# Patient Record
Sex: MALE | Race: WHITE | Employment: OTHER | URBAN - METROPOLITAN AREA
[De-identification: names, ages, dates, MRNs, and addresses within clinical notes are randomized per-mention and may not be internally consistent; named-entity substitution may affect disease eponyms.]

---

## 2017-01-09 NOTE — TELEPHONE ENCOUNTER
Spouse is requesting to have a call back from the nurse   Pharm stts they sent a request five days ago   Pt is requesting to have more than 1 refill     Current Outpatient Prescriptions:  Dulaglutide (TRULICITY) 1.5 PE/8.1CL Subcutaneous Solution Pen-injec

## 2017-01-11 NOTE — TELEPHONE ENCOUNTER
please advise as does not meet RN protocol for refill criteria- historical written med  Also requesting refills added in    Diabetes Medications  Protocol Criteria:  · Appointment scheduled in the past 6 months or the next 3 months  · A1C < 7.5 in the pas

## 2017-01-12 NOTE — TELEPHONE ENCOUNTER
This medication has been prescribed and refilled by Dr. Joseph Camara. Please send to endocrinology department.

## 2017-01-16 NOTE — TELEPHONE ENCOUNTER
Pt's wife states that she Needs 90 day supply of trulicity medication, as it will be more cost effective.        Current Outpatient Prescriptions:  Dulaglutide (TRULICITY) 1.5 RQ/7.1HL Subcutaneous Solution Pen-injector Inject 1.5 mg into the skin every 7 d

## 2017-03-15 RX ORDER — METFORMIN HYDROCHLORIDE 500 MG/1
TABLET, EXTENDED RELEASE ORAL
Qty: 180 TABLET | Refills: 1 | Status: SHIPPED | OUTPATIENT
Start: 2017-03-15 | End: 2017-09-07 | Stop reason: DRUGHIGH

## 2017-04-24 NOTE — TELEPHONE ENCOUNTER
Current Outpatient Prescriptions:        Dulaglutide (TRULICITY) 1.5 ZG/4.7UD Subcutaneous Solution Pen-injector Inject 1.5 mg into the skin every 7 days.  Inject 1.5 mg into the skin every 7 days Disp: 12 pen Rfl: 0

## 2017-05-19 ENCOUNTER — APPOINTMENT (OUTPATIENT)
Dept: LAB | Facility: HOSPITAL | Age: 72
End: 2017-05-19
Attending: INTERNAL MEDICINE
Payer: MEDICARE

## 2017-05-19 DIAGNOSIS — IMO0001 TYPE II OR UNSPECIFIED TYPE DIABETES MELLITUS WITHOUT MENTION OF COMPLICATION, UNCONTROLLED: ICD-10-CM

## 2017-05-19 PROCEDURE — 82570 ASSAY OF URINE CREATININE: CPT

## 2017-05-19 PROCEDURE — 82043 UR ALBUMIN QUANTITATIVE: CPT

## 2017-05-19 PROCEDURE — 80061 LIPID PANEL: CPT

## 2017-05-19 PROCEDURE — 36415 COLL VENOUS BLD VENIPUNCTURE: CPT

## 2017-05-19 PROCEDURE — 80053 COMPREHEN METABOLIC PANEL: CPT

## 2017-05-19 PROCEDURE — 83036 HEMOGLOBIN GLYCOSYLATED A1C: CPT

## 2017-05-19 PROCEDURE — 84443 ASSAY THYROID STIM HORMONE: CPT

## 2017-06-07 ENCOUNTER — OFFICE VISIT (OUTPATIENT)
Dept: ENDOCRINOLOGY CLINIC | Facility: CLINIC | Age: 72
End: 2017-06-07

## 2017-06-07 VITALS
DIASTOLIC BLOOD PRESSURE: 89 MMHG | HEART RATE: 80 BPM | HEIGHT: 71 IN | SYSTOLIC BLOOD PRESSURE: 134 MMHG | WEIGHT: 307 LBS | BODY MASS INDEX: 42.98 KG/M2

## 2017-06-07 DIAGNOSIS — E11.65 UNCONTROLLED TYPE 2 DIABETES MELLITUS WITH HYPERGLYCEMIA, WITH LONG-TERM CURRENT USE OF INSULIN (HCC): Primary | ICD-10-CM

## 2017-06-07 DIAGNOSIS — Z79.4 UNCONTROLLED TYPE 2 DIABETES MELLITUS WITH HYPERGLYCEMIA, WITH LONG-TERM CURRENT USE OF INSULIN (HCC): Primary | ICD-10-CM

## 2017-06-07 DIAGNOSIS — IMO0001 UNCONTROLLED DIABETES MELLITUS TYPE 2 WITHOUT COMPLICATIONS, UNSPECIFIED LONG TERM INSULIN USE STATUS: ICD-10-CM

## 2017-06-07 PROCEDURE — 82962 GLUCOSE BLOOD TEST: CPT | Performed by: INTERNAL MEDICINE

## 2017-06-07 PROCEDURE — 99213 OFFICE O/P EST LOW 20 MIN: CPT | Performed by: INTERNAL MEDICINE

## 2017-06-07 PROCEDURE — 36416 COLLJ CAPILLARY BLOOD SPEC: CPT | Performed by: INTERNAL MEDICINE

## 2017-06-07 RX ORDER — METFORMIN HYDROCHLORIDE 500 MG/1
TABLET, EXTENDED RELEASE ORAL
Qty: 360 TABLET | Refills: 3 | Status: SHIPPED | OUTPATIENT
Start: 2017-06-07 | End: 2017-09-07

## 2017-06-07 NOTE — PROGRESS NOTES
Name: Manuel Gee  Date: 6/7/2017    Referring Physician: No ref. provider found    HISTORY OF PRESENT ILLNESS   Manuel Gee is a 80year old male who presents for diabetes mellitus, diagnosed at age 49.       Prior HbA, C or glycohemoglobin wer by mouth daily. , Disp: 90 tablet, Rfl: 1  •  MetFORMIN HCl  MG Oral Tablet 24 Hr, TAKE 1 TABLET TWICE A DAY  WITH MEALS, Disp: 180 tablet, Rfl: 3  •  Pravastatin Sodium (PRAVACHOL) 40 MG Oral Tab, Take 1 tablet (40 mg total) by mouth nightly., Disp: LEG/ANKLE SURGERY PROC UNLISTED Left in  1980's    Comment bone chips removed from ankle    HERNIA SURGERY Right [facility]  /89 mmHg  Pulse 80  Ht 5' 11\" (1.803 m)  Wt 307 lb (139.254 kg)  BMI 42.84 kg/m2    Gener

## 2017-07-06 NOTE — TELEPHONE ENCOUNTER
From: Doreen Frankel  Sent: 7/6/2017 5:17 PM CDT  Subject: Medication Renewal Request    Doreen Frankel would like a refill of the following medications:  Dulaglutide (TRULICITY) 1.5 FX/6.3LD Subcutaneous Solution Pen-injector [Sophia Hernandez MD]    P

## 2017-09-03 DIAGNOSIS — I10 ESSENTIAL HYPERTENSION: ICD-10-CM

## 2017-09-05 RX ORDER — LOSARTAN POTASSIUM 50 MG/1
TABLET ORAL
Qty: 30 TABLET | Refills: 1 | Status: SHIPPED | OUTPATIENT
Start: 2017-09-05 | End: 2017-09-07

## 2017-09-07 ENCOUNTER — OFFICE VISIT (OUTPATIENT)
Dept: ENDOCRINOLOGY CLINIC | Facility: CLINIC | Age: 72
End: 2017-09-07

## 2017-09-07 ENCOUNTER — OFFICE VISIT (OUTPATIENT)
Dept: NEPHROLOGY | Facility: CLINIC | Age: 72
End: 2017-09-07

## 2017-09-07 VITALS
WEIGHT: 307.63 LBS | HEIGHT: 71 IN | SYSTOLIC BLOOD PRESSURE: 149 MMHG | DIASTOLIC BLOOD PRESSURE: 92 MMHG | BODY MASS INDEX: 43.07 KG/M2 | HEART RATE: 73 BPM

## 2017-09-07 VITALS
HEART RATE: 73 BPM | WEIGHT: 307.38 LBS | BODY MASS INDEX: 43.03 KG/M2 | HEIGHT: 71 IN | DIASTOLIC BLOOD PRESSURE: 96 MMHG | TEMPERATURE: 99 F | SYSTOLIC BLOOD PRESSURE: 158 MMHG

## 2017-09-07 DIAGNOSIS — IMO0001 UNCONTROLLED DIABETES MELLITUS TYPE 2 WITHOUT COMPLICATIONS, UNSPECIFIED LONG TERM INSULIN USE STATUS: ICD-10-CM

## 2017-09-07 DIAGNOSIS — E78.5 HYPERLIPIDEMIA LDL GOAL <100: ICD-10-CM

## 2017-09-07 DIAGNOSIS — I10 ESSENTIAL HYPERTENSION: ICD-10-CM

## 2017-09-07 DIAGNOSIS — E11.65 UNCONTROLLED TYPE 2 DIABETES MELLITUS WITH HYPERGLYCEMIA, WITH LONG-TERM CURRENT USE OF INSULIN (HCC): Primary | ICD-10-CM

## 2017-09-07 DIAGNOSIS — Z79.4 UNCONTROLLED TYPE 2 DIABETES MELLITUS WITH HYPERGLYCEMIA, WITH LONG-TERM CURRENT USE OF INSULIN (HCC): Primary | ICD-10-CM

## 2017-09-07 LAB
CARTRIDGE LOT#: ABNORMAL NUMERIC
GLUCOSE BLOOD: 132
HEMOGLOBIN A1C: 7 % (ref 4.3–5.6)
TEST STRIP LOT #: NORMAL NUMERIC

## 2017-09-07 PROCEDURE — 82962 GLUCOSE BLOOD TEST: CPT | Performed by: INTERNAL MEDICINE

## 2017-09-07 PROCEDURE — 36416 COLLJ CAPILLARY BLOOD SPEC: CPT | Performed by: INTERNAL MEDICINE

## 2017-09-07 PROCEDURE — 83036 HEMOGLOBIN GLYCOSYLATED A1C: CPT | Performed by: INTERNAL MEDICINE

## 2017-09-07 PROCEDURE — 99213 OFFICE O/P EST LOW 20 MIN: CPT | Performed by: INTERNAL MEDICINE

## 2017-09-07 PROCEDURE — G0463 HOSPITAL OUTPT CLINIC VISIT: HCPCS | Performed by: INTERNAL MEDICINE

## 2017-09-07 RX ORDER — FENOFIBRATE 160 MG/1
160 TABLET ORAL DAILY
Qty: 90 TABLET | Refills: 3 | Status: SHIPPED | OUTPATIENT
Start: 2017-09-07 | End: 2018-11-28

## 2017-09-07 RX ORDER — GLIPIZIDE 10 MG/1
10 TABLET, FILM COATED, EXTENDED RELEASE ORAL 2 TIMES DAILY
Qty: 180 TABLET | Refills: 3 | Status: SHIPPED | OUTPATIENT
Start: 2017-09-07 | End: 2018-11-28

## 2017-09-07 RX ORDER — LOSARTAN POTASSIUM 50 MG/1
50 TABLET ORAL
Qty: 90 TABLET | Refills: 3 | Status: SHIPPED | OUTPATIENT
Start: 2017-09-07 | End: 2017-12-04 | Stop reason: DRUGHIGH

## 2017-09-07 RX ORDER — AMLODIPINE BESYLATE 10 MG/1
10 TABLET ORAL DAILY
Qty: 90 TABLET | Refills: 3 | Status: SHIPPED | OUTPATIENT
Start: 2017-09-07 | End: 2018-11-28

## 2017-09-07 RX ORDER — PRAVASTATIN SODIUM 40 MG
40 TABLET ORAL NIGHTLY
Qty: 90 TABLET | Refills: 3 | Status: SHIPPED | OUTPATIENT
Start: 2017-09-07 | End: 2018-11-28

## 2017-09-07 RX ORDER — METFORMIN HYDROCHLORIDE 500 MG/1
TABLET, EXTENDED RELEASE ORAL
Qty: 360 TABLET | Refills: 3 | Status: SHIPPED | OUTPATIENT
Start: 2017-09-07 | End: 2018-11-28

## 2017-09-07 NOTE — PROGRESS NOTES
Progress Note     Palmira Trivedi is a 72 yrs old male with pmh of DM x 20 yrs, HTN X 20 yrs, sleep apnea, morbidly obese on CPAP, right hip replacement, CVA who presented today for follow up     Patient baseline creatinine is around 1.0-1.1 till 2013, ELECTROCARDIOGRAM, COMPLETE      Comment: scanned to media tab  1979: HERNIA SURGERY Right  in  1980's: LEG/ANKLE SURGERY PROC UNLISTED Left      Comment: bone chips removed from ankle  2013: TOTAL HIP REPLACEMENT Right  1982: VASECTOMY        Medications wheezing  Gastrointestinal:  Negative for abdominal pain, constipation  Genitourinary:  Negative for dysuria and hematuria  Hema/Lymph:  Negative for easy bleeding and easy bruising  Integumentary:  Negative for pruritus and rash  Musculoskeletal:  Negativ Visit:    Signed Prescriptions Disp Refills    Losartan Potassium 50 MG Oral Tab 90 tablet 3      Sig: Take 1 tablet (50 mg total) by mouth once daily.       AmLODIPine Besylate 10 MG Oral Tab 90 tablet 3      Sig: Take 1 tablet (10 mg total) by mouth daily

## 2017-09-07 NOTE — PROGRESS NOTES
Name: Rosana Zimmerman  Date: 9/7/2017    Referring Physician: No ref. provider found    HISTORY OF PRESENT ILLNESS   Rosana Zimmerman is a 80year old male who presents for diabetes mellitus, diagnosed at age 49.       Prior HbA, C or glycohemoglobin wer tablet (100 mg total) by mouth once daily. , Disp: 90 tablet, Rfl: 3  •  Fenofibrate 160 MG Oral Tab, Take 1 tablet (160 mg total) by mouth daily. , Disp: 90 tablet, Rfl: 3  •  Pravastatin Sodium (PRAVACHOL) 40 MG Oral Tab, Take 1 tablet (40 mg total) by igor TOTAL HIP REPLACEMENT Right  1982: VASECTOMY      PHYSICAL EXAM  /92 (BP Location: Left arm, Patient Position: Sitting, Cuff Size: large)   Pulse 73   Ht 5' 11\" (1.803 m)   Wt (!) 307 lb 9.6 oz (139.5 kg)   BMI 42.90 kg/m²     General Appearance:  a

## 2017-11-14 ENCOUNTER — OFFICE VISIT (OUTPATIENT)
Dept: INTERNAL MEDICINE CLINIC | Facility: CLINIC | Age: 72
End: 2017-11-14

## 2017-11-14 VITALS
BODY MASS INDEX: 43.45 KG/M2 | HEART RATE: 77 BPM | SYSTOLIC BLOOD PRESSURE: 152 MMHG | DIASTOLIC BLOOD PRESSURE: 86 MMHG | HEIGHT: 71 IN | TEMPERATURE: 98 F | WEIGHT: 310.38 LBS

## 2017-11-14 DIAGNOSIS — E79.0 HYPERURICEMIA: ICD-10-CM

## 2017-11-14 DIAGNOSIS — R07.89 CHEST WALL PAIN: Primary | ICD-10-CM

## 2017-11-14 DIAGNOSIS — I10 ESSENTIAL HYPERTENSION: ICD-10-CM

## 2017-11-14 PROCEDURE — 99213 OFFICE O/P EST LOW 20 MIN: CPT | Performed by: INTERNAL MEDICINE

## 2017-11-14 PROCEDURE — G0463 HOSPITAL OUTPT CLINIC VISIT: HCPCS | Performed by: INTERNAL MEDICINE

## 2017-11-14 RX ORDER — ALLOPURINOL 100 MG/1
100 TABLET ORAL
Qty: 90 TABLET | Refills: 3 | Status: SHIPPED | OUTPATIENT
Start: 2017-11-14 | End: 2018-11-15

## 2017-11-14 NOTE — PATIENT INSTRUCTIONS
Please apply heat to the area of pain below your left shoulder 2-3 times daily. Take Tylenol or acetaminophen as needed for pain relief. Await upcoming physical in December.

## 2017-11-14 NOTE — PROGRESS NOTES
Alex Krishnan is a 80year old male who presents this afternoon for evaluation. HPI:   For the past week, he has had persistent intermittent pain in his upper left anterior chest wall just below his left shoulder.   Pain is \"annoying,\" but does not l total) by mouth once daily. Disp: 90 tablet Rfl: 3   AmLODIPine Besylate 10 MG Oral Tab Take 1 tablet (10 mg total) by mouth daily.  Disp: 90 tablet Rfl: 3   Dulaglutide (TRULICITY) 1.5 CV/7.3IZ Subcutaneous Solution Pen-injector Inject 1.5 mg into the skin Heart Disease Mother      Valvular heart disease   • Diabetes Father    • Hypertension Father    • CVA [OTHER] Father      Age 62s      Social History:  Smoking status: Former Smoker                                                              Packs/day: 1 for evaluation

## 2017-12-04 ENCOUNTER — OFFICE VISIT (OUTPATIENT)
Dept: INTERNAL MEDICINE CLINIC | Facility: CLINIC | Age: 72
End: 2017-12-04

## 2017-12-04 VITALS
WEIGHT: 311 LBS | DIASTOLIC BLOOD PRESSURE: 82 MMHG | BODY MASS INDEX: 43.54 KG/M2 | HEART RATE: 83 BPM | HEIGHT: 71 IN | SYSTOLIC BLOOD PRESSURE: 150 MMHG

## 2017-12-04 DIAGNOSIS — E11.9 TYPE 2 DIABETES MELLITUS WITHOUT COMPLICATION, UNSPECIFIED LONG TERM INSULIN USE STATUS: ICD-10-CM

## 2017-12-04 DIAGNOSIS — E79.0 HYPERURICEMIA: ICD-10-CM

## 2017-12-04 DIAGNOSIS — E78.5 DYSLIPIDEMIA: ICD-10-CM

## 2017-12-04 DIAGNOSIS — E66.01 MORBID OBESITY (HCC): ICD-10-CM

## 2017-12-04 DIAGNOSIS — Q21.1 ASD (ATRIAL SEPTAL DEFECT): ICD-10-CM

## 2017-12-04 DIAGNOSIS — G47.33 OBSTRUCTIVE SLEEP APNEA: ICD-10-CM

## 2017-12-04 DIAGNOSIS — Z00.00 ANNUAL PHYSICAL EXAM: Primary | ICD-10-CM

## 2017-12-04 DIAGNOSIS — I10 ESSENTIAL HYPERTENSION: ICD-10-CM

## 2017-12-04 DIAGNOSIS — Z00.00 ENCOUNTER FOR ANNUAL HEALTH EXAMINATION: ICD-10-CM

## 2017-12-04 DIAGNOSIS — M19.91 PRIMARY OSTEOARTHRITIS, UNSPECIFIED SITE: ICD-10-CM

## 2017-12-04 DIAGNOSIS — Z12.5 PROSTATE CANCER SCREENING: ICD-10-CM

## 2017-12-04 DIAGNOSIS — Z86.73 HISTORY OF CVA (CEREBROVASCULAR ACCIDENT): ICD-10-CM

## 2017-12-04 DIAGNOSIS — N18.2 CHRONIC KIDNEY DISEASE, STAGE II (MILD): ICD-10-CM

## 2017-12-04 DIAGNOSIS — I70.0 AORTIC ATHEROSCLEROSIS (HCC): ICD-10-CM

## 2017-12-04 PROCEDURE — G0439 PPPS, SUBSEQ VISIT: HCPCS | Performed by: INTERNAL MEDICINE

## 2017-12-04 RX ORDER — LOSARTAN POTASSIUM 100 MG/1
100 TABLET ORAL DAILY
Qty: 90 TABLET | Refills: 3 | Status: SHIPPED | OUTPATIENT
Start: 2017-12-04 | End: 2018-11-15

## 2017-12-04 NOTE — H&P
Doreen Frankel is a 80year old male who presents for a complete physical exam.  HPI:   He feels well today. No specific issues for discussion. Left sided chest wall pain which prompted a recent visit has resolved.   He follows regularly with [facility] (TRULICITY) 1.5 JL/9.2JR Subcutaneous Solution Pen-injector Inject 1.5 mg into the skin every 7 days.  Inject 1.5 mg into the skin every 7 days Disp: 12 pen Rfl: 3   MetFORMIN HCl  MG Oral Tablet 24 Hr TAKE 2 TABLET TWICE A DAY  WITH MEALS Disp: 360 t Former Smoker                                                              Packs/day: 1.00      Years: 20.00        Types: Cigarettes     Quit date: 1/1/1978  Smokeless tobacco: Never Used                      Alcohol use: Yes           0.0 oz/week     Com pneumococcal vaccines and Zostavax. He declines currently. Check CMP CBC lipid profile uric acid level and screening PSA when able. Order sent. Increase losartan to 100 mg daily. Prescription sent to pharmacy. Continue other medications.   Return visi

## 2017-12-04 NOTE — PATIENT INSTRUCTIONS
Please obtain blood tests soon when you can. Increase losartan to 100 mg daily. Continue other medications. Please try to eat healthy, exercise regularly and lose weight. Return visit in 4-6 weeks for blood pressure check.     [address] aneurysm screening (once between ages 73-68)  No results found for this or any previous visit.  Limited to patients who meet one of the following criteria:   • Men who are 73-68 years old and have smoked more than 100 cigarettes in their lifetime   • Anyone VIII or IX concentrates   Clients of institutions for the mentally retarded   Persons who live in the same house as a HepB virus carrier   Homosexual men   Illicit injectable drug abusers     Tetanus Toxoid- Only covered with a cut with metal- TD and TDaP

## 2017-12-06 ENCOUNTER — PATIENT MESSAGE (OUTPATIENT)
Dept: INTERNAL MEDICINE CLINIC | Facility: CLINIC | Age: 72
End: 2017-12-06

## 2017-12-06 DIAGNOSIS — H91.90 DECREASED HEARING, UNSPECIFIED LATERALITY: Primary | ICD-10-CM

## 2017-12-07 ENCOUNTER — APPOINTMENT (OUTPATIENT)
Dept: LAB | Facility: HOSPITAL | Age: 72
End: 2017-12-07
Attending: INTERNAL MEDICINE
Payer: MEDICARE

## 2017-12-07 DIAGNOSIS — N18.2 CHRONIC KIDNEY DISEASE, STAGE II (MILD): ICD-10-CM

## 2017-12-07 DIAGNOSIS — Z12.5 PROSTATE CANCER SCREENING: ICD-10-CM

## 2017-12-07 DIAGNOSIS — E78.5 DYSLIPIDEMIA: ICD-10-CM

## 2017-12-07 DIAGNOSIS — E79.0 HYPERURICEMIA: ICD-10-CM

## 2017-12-07 PROCEDURE — 80061 LIPID PANEL: CPT

## 2017-12-07 PROCEDURE — 36415 COLL VENOUS BLD VENIPUNCTURE: CPT

## 2017-12-07 PROCEDURE — 85027 COMPLETE CBC AUTOMATED: CPT

## 2017-12-07 PROCEDURE — 84550 ASSAY OF BLOOD/URIC ACID: CPT

## 2017-12-07 PROCEDURE — 80053 COMPREHEN METABOLIC PANEL: CPT

## 2017-12-07 NOTE — TELEPHONE ENCOUNTER
From: Nayeli Gleason  To: Denver Silber, MD  Sent: 12/6/2017 1:23 PM CST  Subject: Referral Request    I am going for hearing aids and Medicare requires our primary physician to send a prescription for a hearing test. I can pick it up at your office or

## 2017-12-21 ENCOUNTER — OFFICE VISIT (OUTPATIENT)
Dept: PULMONOLOGY | Facility: CLINIC | Age: 72
End: 2017-12-21

## 2017-12-21 VITALS
OXYGEN SATURATION: 97 % | SYSTOLIC BLOOD PRESSURE: 152 MMHG | BODY MASS INDEX: 43.54 KG/M2 | DIASTOLIC BLOOD PRESSURE: 81 MMHG | WEIGHT: 311 LBS | HEIGHT: 71 IN | HEART RATE: 90 BPM

## 2017-12-21 DIAGNOSIS — G47.33 OBSTRUCTIVE SLEEP APNEA: Primary | ICD-10-CM

## 2017-12-21 PROCEDURE — G0463 HOSPITAL OUTPT CLINIC VISIT: HCPCS | Performed by: INTERNAL MEDICINE

## 2017-12-21 PROCEDURE — 99213 OFFICE O/P EST LOW 20 MIN: CPT | Performed by: INTERNAL MEDICINE

## 2017-12-21 NOTE — PROGRESS NOTES
The patient is a 29-year-old male who I know well from prior evaluation of comes in now for follow-up. He is vigilant with use of his CPAP device but he is now drowsy again including falling asleep during the day.     Review of systems: Vision normal. Ear

## 2017-12-28 ENCOUNTER — OFFICE VISIT (OUTPATIENT)
Dept: SLEEP CENTER | Age: 72
End: 2017-12-28
Attending: INTERNAL MEDICINE
Payer: MEDICARE

## 2017-12-28 DIAGNOSIS — Z76.89 SLEEP CONCERN: Primary | ICD-10-CM

## 2017-12-28 PROCEDURE — 95811 POLYSOM 6/>YRS CPAP 4/> PARM: CPT

## 2017-12-30 NOTE — PROCEDURES
[facility]  Accredited by the [facility] Sleep Medicine ([facility])    PATIENT'S NAME: Kishor Moran PHYSICIAN: Kuldip Yoo MD   REFERRING PHYSICIAN: Kuldip Yoo MD   PATIENT ACCOUNT #: [de-identified] LOCATION: were 450 periodic limb movements for a periodic limb movement index of 118 events per hour of which 20 per hour were associated with arousal.  Lowest desaturation was to 84%, the average heart rate was 64 beats per minute, and there was no significant darius

## 2018-01-02 ENCOUNTER — TELEPHONE (OUTPATIENT)
Dept: PULMONOLOGY | Facility: CLINIC | Age: 73
End: 2018-01-02

## 2018-01-02 DIAGNOSIS — G47.33 OBSTRUCTIVE SLEEP APNEA: Primary | ICD-10-CM

## 2018-01-02 NOTE — TELEPHONE ENCOUNTER
----- Message from Rudolph San MD sent at 1/2/2018  2:20 PM CST -----  RN, I spoke with the patient regarding the results of his CPAP titration. Please work on setting him up with the device and appropriate follow-up.

## 2018-01-08 ENCOUNTER — TELEPHONE (OUTPATIENT)
Dept: PULMONOLOGY | Facility: CLINIC | Age: 73
End: 2018-01-08

## 2018-01-08 DIAGNOSIS — G47.33 OSA (OBSTRUCTIVE SLEEP APNEA): Primary | ICD-10-CM

## 2018-01-08 NOTE — TELEPHONE ENCOUNTER
Pt states that he has not heard from [facility] re: CPAP orders and is anxious to get started with new machine. Please call.

## 2018-01-08 NOTE — TELEPHONE ENCOUNTER
Spoke to Lenny Parham from [address] [place] [address] who stts that they received the order for cpap machine and supplies. Lenny Parham stts that Darien Cesar is taking care of pt order. Darien Cesar not available to talk, will try again later.

## 2018-01-16 ENCOUNTER — OFFICE VISIT (OUTPATIENT)
Dept: INTERNAL MEDICINE CLINIC | Facility: CLINIC | Age: 73
End: 2018-01-16

## 2018-01-16 VITALS
DIASTOLIC BLOOD PRESSURE: 78 MMHG | SYSTOLIC BLOOD PRESSURE: 138 MMHG | HEIGHT: 71 IN | WEIGHT: 313 LBS | HEART RATE: 85 BPM | BODY MASS INDEX: 43.82 KG/M2

## 2018-01-16 DIAGNOSIS — I10 ESSENTIAL HYPERTENSION: Primary | ICD-10-CM

## 2018-01-16 PROCEDURE — 99213 OFFICE O/P EST LOW 20 MIN: CPT | Performed by: INTERNAL MEDICINE

## 2018-01-16 PROCEDURE — G0463 HOSPITAL OUTPT CLINIC VISIT: HCPCS | Performed by: INTERNAL MEDICINE

## 2018-01-16 RX ORDER — MULTIVITAMIN
1 TABLET ORAL DAILY
COMMUNITY

## 2018-01-16 NOTE — PROGRESS NOTES
Josy Wright is a 71year old male. Patient presents with:  Hypertension    HPI:   Mr. Antonia Hill presents this morning for follow-up of hypertension. At his physical last month, dose of losartan was increased because of elevated blood pressure.   Labs morning.    Disp:  Rfl:    Glucose Blood (ONETOUCH ULTRA BLUE) In Vitro Strip test daily Disp:  Rfl:        Pcn [Penicillins]       Unknown   Past Medical History:   Diagnosis Date   • Aortic atherosclerosis (HCC)     CT scan 4-14   • ASD (atrial septal def patient is asked to return in 3 months.     Yandy Sunshine MD  1/16/2018  10:05 AM

## 2018-02-01 ENCOUNTER — OFFICE VISIT (OUTPATIENT)
Dept: ENDOCRINOLOGY CLINIC | Facility: CLINIC | Age: 73
End: 2018-02-01

## 2018-02-01 ENCOUNTER — TELEPHONE (OUTPATIENT)
Dept: NEPHROLOGY | Facility: CLINIC | Age: 73
End: 2018-02-01

## 2018-02-01 VITALS
BODY MASS INDEX: 43.4 KG/M2 | SYSTOLIC BLOOD PRESSURE: 150 MMHG | WEIGHT: 310 LBS | HEIGHT: 71 IN | DIASTOLIC BLOOD PRESSURE: 90 MMHG | HEART RATE: 83 BPM

## 2018-02-01 DIAGNOSIS — Z79.4 CONTROLLED TYPE 2 DIABETES MELLITUS WITH HYPERGLYCEMIA, WITH LONG-TERM CURRENT USE OF INSULIN (HCC): Primary | ICD-10-CM

## 2018-02-01 DIAGNOSIS — I10 UNCONTROLLED HYPERTENSION: Primary | ICD-10-CM

## 2018-02-01 DIAGNOSIS — E11.65 CONTROLLED TYPE 2 DIABETES MELLITUS WITH HYPERGLYCEMIA, WITH LONG-TERM CURRENT USE OF INSULIN (HCC): Primary | ICD-10-CM

## 2018-02-01 LAB
CARTRIDGE LOT#: ABNORMAL NUMERIC
GLUCOSE BLOOD: 185
HEMOGLOBIN A1C: 7.3 % (ref 4.3–5.6)
TEST STRIP LOT #: NORMAL NUMERIC

## 2018-02-01 PROCEDURE — 99213 OFFICE O/P EST LOW 20 MIN: CPT | Performed by: INTERNAL MEDICINE

## 2018-02-01 PROCEDURE — 36416 COLLJ CAPILLARY BLOOD SPEC: CPT | Performed by: INTERNAL MEDICINE

## 2018-02-01 PROCEDURE — 83036 HEMOGLOBIN GLYCOSYLATED A1C: CPT | Performed by: INTERNAL MEDICINE

## 2018-02-01 PROCEDURE — 82962 GLUCOSE BLOOD TEST: CPT | Performed by: INTERNAL MEDICINE

## 2018-02-01 NOTE — PROGRESS NOTES
Name: Torin Greene  Date: 2/1/2018    Referring Physician: No ref. provider found    HISTORY OF PRESENT ILLNESS   Torin Greene is a 71year old male who presents for diabetes mellitus, diagnosed at age 49.       Prior HbA, C or glycohemoglobin wer DAY  WITH MEALS, Disp: 360 tablet, Rfl: 3  •  GlipiZIDE ER 10 MG Oral Tablet 24 Hr, Take 1 tablet (10 mg total) by mouth 2 (two) times daily. , Disp: 180 tablet, Rfl: 3  •  Fenofibrate 160 MG Oral Tab, Take 1 tablet (160 mg total) by mouth daily. , Disp: [facility])    • Obstructive sleep apnea     On nightly CPAP   • Osteoarthritis     Status post right KANCHAN January 2013   • Type 2 diabetes mellitus [facility])        Surgical history:   Past Surgical History:  1979: INGUINAL HERNIA REPAIR Right  1980's: LEG/ANKLE HOLDEN normal at home - will follow up with Dr. Paige Gonzalez  -Normal lipids    RTC 6 months    2/1/2018  Peter De Oliveira MD

## 2018-02-01 NOTE — TELEPHONE ENCOUNTER
Losartan was increased to 100 mg per primary care. Check BMP - ordered.      Call back in 2 weeks with home BP readings

## 2018-02-01 NOTE — TELEPHONE ENCOUNTER
Patient contacted. Dr. Sky Bermudez message read to patient. He is aware to get BMP drawn and knows to monitor blood pressure and call Dr. Obi Lockett in 2 weeks with B/P readings.

## 2018-02-02 ENCOUNTER — APPOINTMENT (OUTPATIENT)
Dept: LAB | Facility: HOSPITAL | Age: 73
End: 2018-02-02
Attending: INTERNAL MEDICINE
Payer: MEDICARE

## 2018-02-02 DIAGNOSIS — I10 UNCONTROLLED HYPERTENSION: ICD-10-CM

## 2018-02-02 LAB
ANION GAP SERPL CALC-SCNC: 9 MMOL/L (ref 0–18)
BUN SERPL-MCNC: 19 MG/DL (ref 8–20)
BUN/CREAT SERPL: 17.8 (ref 10–20)
CALCIUM SERPL-MCNC: 9.5 MG/DL (ref 8.5–10.5)
CHLORIDE SERPL-SCNC: 106 MMOL/L (ref 95–110)
CO2 SERPL-SCNC: 25 MMOL/L (ref 22–32)
CREAT SERPL-MCNC: 1.07 MG/DL (ref 0.5–1.5)
GLUCOSE SERPL-MCNC: 162 MG/DL (ref 70–99)
OSMOLALITY UR CALC.SUM OF ELEC: 296 MOSM/KG (ref 275–295)
POTASSIUM SERPL-SCNC: 4.6 MMOL/L (ref 3.3–5.1)
SODIUM SERPL-SCNC: 140 MMOL/L (ref 136–144)

## 2018-02-02 PROCEDURE — 36415 COLL VENOUS BLD VENIPUNCTURE: CPT

## 2018-02-02 PROCEDURE — 80048 BASIC METABOLIC PNL TOTAL CA: CPT

## 2018-02-08 ENCOUNTER — TELEPHONE (OUTPATIENT)
Dept: NEPHROLOGY | Facility: CLINIC | Age: 73
End: 2018-02-08

## 2018-02-09 NOTE — TELEPHONE ENCOUNTER
Contacted pt. Notified him his kidney function is stable & A1C at goal at 7.3%.  He has been checking his BPs:    2/1/18  132/85  62  2/2/18  163/93  67  **He was stressed this day  2/5/18  128/83  62  2/6/18  132/90  64  2/7/18  125/84  69  2/8/18  124/77

## 2018-02-23 ENCOUNTER — MED REC SCAN ONLY (OUTPATIENT)
Dept: INTERNAL MEDICINE CLINIC | Facility: CLINIC | Age: 73
End: 2018-02-23

## 2018-03-13 ENCOUNTER — TELEPHONE (OUTPATIENT)
Dept: NEPHROLOGY | Facility: CLINIC | Age: 73
End: 2018-03-13

## 2018-03-13 NOTE — TELEPHONE ENCOUNTER
Blood pressure readings have been placed on Dr. Lisa Saldana desk to review when she returns to the office on Monday 3/19/18.

## 2018-03-19 NOTE — TELEPHONE ENCOUNTER
Patient contacted. Dr. Sola Cary's blood pressure advice relayed. Patient is aware of acceptable readings and no change in medication.

## 2018-03-20 ENCOUNTER — OFFICE VISIT (OUTPATIENT)
Dept: PULMONOLOGY | Facility: CLINIC | Age: 73
End: 2018-03-20

## 2018-03-20 VITALS
WEIGHT: 315 LBS | OXYGEN SATURATION: 96 % | DIASTOLIC BLOOD PRESSURE: 85 MMHG | HEART RATE: 78 BPM | SYSTOLIC BLOOD PRESSURE: 150 MMHG | HEIGHT: 71 IN | BODY MASS INDEX: 44.1 KG/M2

## 2018-03-20 DIAGNOSIS — G47.33 OBSTRUCTIVE SLEEP APNEA: Primary | ICD-10-CM

## 2018-03-20 PROCEDURE — G0463 HOSPITAL OUTPT CLINIC VISIT: HCPCS | Performed by: INTERNAL MEDICINE

## 2018-03-20 PROCEDURE — 99213 OFFICE O/P EST LOW 20 MIN: CPT | Performed by: INTERNAL MEDICINE

## 2018-03-20 NOTE — PROGRESS NOTES
The patient is a 62-year-old male who I know well from prior evaluation comes in now for follow-up. I reviewed reviewed his download of data and his residual events are 2/h and his average daily usage is 9-1/2 hours.   He is well coordinated with the devic

## 2018-08-17 NOTE — TELEPHONE ENCOUNTER
Patient dropped off at the [facility] [address] at list of his February 2018 Blood pressure reading. Placed in the RN box at the  3/13/18. Essential hypertension

## 2018-08-22 RX ORDER — DULAGLUTIDE 1.5 MG/.5ML
INJECTION, SOLUTION SUBCUTANEOUS
Qty: 6 ML | Refills: 3 | Status: SHIPPED | OUTPATIENT
Start: 2018-08-22 | End: 2019-07-30

## 2018-09-06 ENCOUNTER — OFFICE VISIT (OUTPATIENT)
Dept: ENDOCRINOLOGY CLINIC | Facility: CLINIC | Age: 73
End: 2018-09-06
Payer: MEDICARE

## 2018-09-06 ENCOUNTER — OFFICE VISIT (OUTPATIENT)
Dept: NEPHROLOGY | Facility: CLINIC | Age: 73
End: 2018-09-06
Payer: MEDICARE

## 2018-09-06 VITALS
SYSTOLIC BLOOD PRESSURE: 131 MMHG | BODY MASS INDEX: 43.59 KG/M2 | HEART RATE: 72 BPM | WEIGHT: 311.38 LBS | TEMPERATURE: 99 F | DIASTOLIC BLOOD PRESSURE: 81 MMHG | HEIGHT: 71 IN

## 2018-09-06 VITALS — DIASTOLIC BLOOD PRESSURE: 80 MMHG | SYSTOLIC BLOOD PRESSURE: 131 MMHG | HEART RATE: 68 BPM

## 2018-09-06 DIAGNOSIS — Z79.4 CONTROLLED TYPE 2 DIABETES MELLITUS WITH COMPLICATION, WITH LONG-TERM CURRENT USE OF INSULIN (HCC): Primary | ICD-10-CM

## 2018-09-06 DIAGNOSIS — E11.8 CONTROLLED TYPE 2 DIABETES MELLITUS WITH COMPLICATION, WITH LONG-TERM CURRENT USE OF INSULIN (HCC): Primary | ICD-10-CM

## 2018-09-06 DIAGNOSIS — N18.2 CKD (CHRONIC KIDNEY DISEASE), STAGE II: Primary | ICD-10-CM

## 2018-09-06 LAB
CARTRIDGE LOT#: ABNORMAL NUMERIC
GLUCOSE BLOOD: 136
HEMOGLOBIN A1C: 7.8 % (ref 4.3–5.6)
TEST STRIP LOT #: NORMAL NUMERIC

## 2018-09-06 PROCEDURE — 36416 COLLJ CAPILLARY BLOOD SPEC: CPT | Performed by: INTERNAL MEDICINE

## 2018-09-06 PROCEDURE — G0463 HOSPITAL OUTPT CLINIC VISIT: HCPCS | Performed by: INTERNAL MEDICINE

## 2018-09-06 PROCEDURE — 82962 GLUCOSE BLOOD TEST: CPT | Performed by: INTERNAL MEDICINE

## 2018-09-06 PROCEDURE — 83036 HEMOGLOBIN GLYCOSYLATED A1C: CPT | Performed by: INTERNAL MEDICINE

## 2018-09-06 PROCEDURE — 99213 OFFICE O/P EST LOW 20 MIN: CPT | Performed by: INTERNAL MEDICINE

## 2018-09-06 NOTE — PROGRESS NOTES
Progress Note     Efraín Leblanc is a 72 yrs old male with pmh of DM x 20 yrs, HTN X 20 yrs, sleep apnea, morbidly obese on CPAP, right hip replacement, CVA who presented today for follow up     Patient baseline creatinine is around 1.0-1.1 till 2013, REPLACEMENT Right  1982: VASECTOMY        Medications (Active prior to today's visit):    Current Outpatient Prescriptions:  TRULICITY 1.5 FH/0.7WC Subcutaneous Solution Pen-injector INJECT 0.5ML (=1.5 MG)     SUBCUTANEOUSLY EVERY 7 DAYS Disp: 6 mL Rfl: 3 bruising  Integumentary:  Negative for pruritus and rash  Musculoskeletal:  Negative for bone/joint symptoms  Neurological:  Negative for gait disturbance  Psychiatric:  Negative for inappropriate interaction and psychiatric symptoms       09/06/18  [id number] 9/6/2018  Lucinda Peterson MD

## 2018-09-06 NOTE — PROGRESS NOTES
Name: Azalea Meyer  Date: 9/6/2018    Referring Physician: No ref. provider found    HISTORY OF PRESENT ILLNESS   Azalea Meyer is a 71year old male who presents for diabetes mellitus, diagnosed at age 49.       Prior HbA, C or glycohemoglobin wer tablet, Rfl: 3  •  GlipiZIDE ER 10 MG Oral Tablet 24 Hr, Take 1 tablet (10 mg total) by mouth 2 (two) times daily. , Disp: 180 tablet, Rfl: 3  •  Fenofibrate 160 MG Oral Tab, Take 1 tablet (160 mg total) by mouth daily. , Disp: 90 tablet, Rfl: 3  •  Pravasta apnea     On nightly CPAP   • Osteoarthritis     Status post right KANCHAN January 2013   • Type 2 diabetes mellitus ([facility] [address]        Surgical history:   Past Surgical History:  1979: INGUINAL HERNIA REPAIR Right  1980's: LEG/ANKLE SURGERY PROC UNLISTED Left months    9/6/2018  Flakita Perez MD

## 2018-09-07 ENCOUNTER — LAB ENCOUNTER (OUTPATIENT)
Dept: LAB | Facility: HOSPITAL | Age: 73
End: 2018-09-07
Attending: INTERNAL MEDICINE
Payer: MEDICARE

## 2018-09-07 ENCOUNTER — TELEPHONE (OUTPATIENT)
Dept: ENDOCRINOLOGY CLINIC | Facility: CLINIC | Age: 73
End: 2018-09-07

## 2018-09-07 DIAGNOSIS — E03.9 HYPOTHYROIDISM, UNSPECIFIED TYPE: Primary | ICD-10-CM

## 2018-09-07 DIAGNOSIS — E11.8 CONTROLLED TYPE 2 DIABETES MELLITUS WITH COMPLICATION, WITH LONG-TERM CURRENT USE OF INSULIN (HCC): ICD-10-CM

## 2018-09-07 DIAGNOSIS — Z79.4 CONTROLLED TYPE 2 DIABETES MELLITUS WITH COMPLICATION, WITH LONG-TERM CURRENT USE OF INSULIN (HCC): ICD-10-CM

## 2018-09-07 DIAGNOSIS — N18.2 CKD (CHRONIC KIDNEY DISEASE), STAGE II: ICD-10-CM

## 2018-09-07 LAB
ALBUMIN SERPL BCP-MCNC: 4.2 G/DL (ref 3.5–4.8)
ALBUMIN/GLOB SERPL: 1.4 {RATIO} (ref 1–2)
ALP SERPL-CCNC: 34 U/L (ref 32–100)
ALT SERPL-CCNC: 46 U/L (ref 17–63)
ANION GAP SERPL CALC-SCNC: 10 MMOL/L (ref 0–18)
AST SERPL-CCNC: 44 U/L (ref 15–41)
BILIRUB SERPL-MCNC: 0.5 MG/DL (ref 0.3–1.2)
BUN SERPL-MCNC: 19 MG/DL (ref 8–20)
BUN/CREAT SERPL: 16.2 (ref 10–20)
CALCIUM SERPL-MCNC: 9.4 MG/DL (ref 8.5–10.5)
CHLORIDE SERPL-SCNC: 105 MMOL/L (ref 95–110)
CHOLEST SERPL-MCNC: 146 MG/DL (ref 110–200)
CO2 SERPL-SCNC: 24 MMOL/L (ref 22–32)
CREAT SERPL-MCNC: 1.17 MG/DL (ref 0.5–1.5)
CREAT UR-MCNC: 79.7 MG/DL
GLOBULIN PLAS-MCNC: 2.9 G/DL (ref 2.5–3.7)
GLUCOSE SERPL-MCNC: 152 MG/DL (ref 70–99)
HDLC SERPL-MCNC: 31 MG/DL
LDLC SERPL CALC-MCNC: 76 MG/DL (ref 0–99)
MICROALBUMIN UR-MCNC: 1.4 MG/DL (ref 0–1.8)
MICROALBUMIN/CREAT UR: 17.6 MG/G{CREAT} (ref 0–20)
NONHDLC SERPL-MCNC: 115 MG/DL
OSMOLALITY UR CALC.SUM OF ELEC: 293 MOSM/KG (ref 275–295)
PATIENT FASTING: YES
PHOSPHATE SERPL-MCNC: 3.5 MG/DL (ref 2.4–4.7)
POTASSIUM SERPL-SCNC: 4.4 MMOL/L (ref 3.3–5.1)
PROT SERPL-MCNC: 7.1 G/DL (ref 5.9–8.4)
SODIUM SERPL-SCNC: 139 MMOL/L (ref 136–144)
TRIGL SERPL-MCNC: 195 MG/DL (ref 1–149)
TSH SERPL-ACNC: 6.88 UIU/ML (ref 0.45–5.33)

## 2018-09-07 PROCEDURE — 80053 COMPREHEN METABOLIC PANEL: CPT

## 2018-09-07 PROCEDURE — 36415 COLL VENOUS BLD VENIPUNCTURE: CPT

## 2018-09-07 PROCEDURE — 84443 ASSAY THYROID STIM HORMONE: CPT

## 2018-09-07 PROCEDURE — 82043 UR ALBUMIN QUANTITATIVE: CPT

## 2018-09-07 PROCEDURE — 80061 LIPID PANEL: CPT

## 2018-09-07 PROCEDURE — 84100 ASSAY OF PHOSPHORUS: CPT

## 2018-09-07 PROCEDURE — 82570 ASSAY OF URINE CREATININE: CPT

## 2018-09-07 NOTE — TELEPHONE ENCOUNTER
Please call patient - good news, labs are overall normal including cholesterol levels, kidney and liver function. However thyroid levels are abnormal, lets plan to repeat TSH, FT4 in 2 months.

## 2018-11-05 ENCOUNTER — APPOINTMENT (OUTPATIENT)
Dept: LAB | Facility: HOSPITAL | Age: 73
End: 2018-11-05
Attending: INTERNAL MEDICINE
Payer: MEDICARE

## 2018-11-05 DIAGNOSIS — E03.9 HYPOTHYROIDISM, UNSPECIFIED TYPE: ICD-10-CM

## 2018-11-05 PROCEDURE — 84443 ASSAY THYROID STIM HORMONE: CPT

## 2018-11-05 PROCEDURE — 36415 COLL VENOUS BLD VENIPUNCTURE: CPT

## 2018-11-05 PROCEDURE — 84439 ASSAY OF FREE THYROXINE: CPT

## 2018-11-07 ENCOUNTER — TELEPHONE (OUTPATIENT)
Dept: ENDOCRINOLOGY CLINIC | Facility: CLINIC | Age: 73
End: 2018-11-07

## 2018-11-07 DIAGNOSIS — E03.9 HYPOTHYROIDISM, UNSPECIFIED TYPE: Primary | ICD-10-CM

## 2018-11-07 RX ORDER — LEVOTHYROXINE SODIUM 0.05 MG/1
50 TABLET ORAL
Qty: 90 TABLET | Refills: 1 | Status: SHIPPED | OUTPATIENT
Start: 2018-11-07 | End: 2019-05-19

## 2018-11-07 NOTE — TELEPHONE ENCOUNTER
Spoke with patient. Patient agreeable to starting LT4 and repeat thyroid levels in 2 months. Reviewed instructions: to take on empty stomach and at least 30-60 min before food, and 4 hrs before MVI. Ordered LT4 and repeat labs as written.

## 2018-11-07 NOTE — TELEPHONE ENCOUNTER
Please call patient - thyroid levels remain abnormal.  Start LT4 50mcg PO daily #30, refill 6 and repeat TSH, FT4 in 2 months. Thanks.

## 2018-11-15 ENCOUNTER — TELEPHONE (OUTPATIENT)
Dept: ENDOCRINOLOGY CLINIC | Facility: CLINIC | Age: 73
End: 2018-11-15

## 2018-11-15 DIAGNOSIS — E79.0 HYPERURICEMIA: ICD-10-CM

## 2018-11-15 RX ORDER — ALLOPURINOL 100 MG/1
100 TABLET ORAL
Qty: 90 TABLET | Refills: 3 | Status: SHIPPED | OUTPATIENT
Start: 2018-11-15 | End: 2018-11-16

## 2018-11-15 RX ORDER — LOSARTAN POTASSIUM 100 MG/1
100 TABLET ORAL DAILY
Qty: 90 TABLET | Refills: 3 | Status: SHIPPED | OUTPATIENT
Start: 2018-11-15 | End: 2018-11-16

## 2018-11-15 NOTE — TELEPHONE ENCOUNTER
Pt called for refills/3mos supply:       Current Outpatient Medications:   •  losartan 100 MG Oral Tab, Take 1 tablet (100 mg total) by mouth daily. , Disp: 90 tablet, Rfl: 3  •  allopurinol 100 MG Oral Tab, Take 1 tablet (100 mg total) by mouth once daily.

## 2018-11-16 RX ORDER — ALLOPURINOL 100 MG/1
100 TABLET ORAL
Qty: 90 TABLET | Refills: 1 | Status: SHIPPED | OUTPATIENT
Start: 2018-11-16 | End: 2019-05-19

## 2018-11-16 RX ORDER — LOSARTAN POTASSIUM 100 MG/1
100 TABLET ORAL DAILY
Qty: 90 TABLET | Refills: 1 | Status: SHIPPED | OUTPATIENT
Start: 2018-11-16 | End: 2019-05-19

## 2018-11-16 NOTE — TELEPHONE ENCOUNTER
Pt called to request that both [facility] mos supply. Please call.

## 2018-11-28 DIAGNOSIS — I10 ESSENTIAL HYPERTENSION: ICD-10-CM

## 2018-11-28 DIAGNOSIS — E78.5 HYPERLIPIDEMIA LDL GOAL <100: ICD-10-CM

## 2018-11-28 RX ORDER — PRAVASTATIN SODIUM 40 MG
40 TABLET ORAL NIGHTLY
Qty: 90 TABLET | Refills: 1 | Status: SHIPPED | OUTPATIENT
Start: 2018-11-28 | End: 2019-05-19

## 2018-11-28 RX ORDER — METFORMIN HYDROCHLORIDE 500 MG/1
TABLET, EXTENDED RELEASE ORAL
Qty: 360 TABLET | Refills: 1 | Status: SHIPPED | OUTPATIENT
Start: 2018-11-28 | End: 2019-05-19

## 2018-11-28 RX ORDER — FENOFIBRATE 160 MG/1
160 TABLET ORAL DAILY
Qty: 90 TABLET | Refills: 1 | Status: SHIPPED | OUTPATIENT
Start: 2018-11-28 | End: 2019-05-19

## 2018-11-28 RX ORDER — GLIPIZIDE 10 MG/1
10 TABLET, FILM COATED, EXTENDED RELEASE ORAL 2 TIMES DAILY
Qty: 180 TABLET | Refills: 1 | Status: SHIPPED | OUTPATIENT
Start: 2018-11-28 | End: 2019-05-19

## 2018-11-28 NOTE — TELEPHONE ENCOUNTER
Last OV 9/6/18. RTC in 6 months. Next scheduled OV 3/4/19. Confirmed last OV no changes. Rx sent to [facility] for review and sign off.

## 2018-11-29 RX ORDER — AMLODIPINE BESYLATE 10 MG/1
10 TABLET ORAL DAILY
Qty: 90 TABLET | Refills: 3 | Status: SHIPPED | OUTPATIENT
Start: 2018-11-29 | End: 2019-12-06

## 2019-01-07 ENCOUNTER — MED REC SCAN ONLY (OUTPATIENT)
Dept: INTERNAL MEDICINE CLINIC | Facility: CLINIC | Age: 74
End: 2019-01-07

## 2019-02-27 ENCOUNTER — TELEPHONE (OUTPATIENT)
Dept: NEPHROLOGY | Facility: CLINIC | Age: 74
End: 2019-02-27

## 2019-02-27 DIAGNOSIS — N18.2 CKD (CHRONIC KIDNEY DISEASE), STAGE II: Primary | ICD-10-CM

## 2019-02-28 ENCOUNTER — APPOINTMENT (OUTPATIENT)
Dept: LAB | Facility: HOSPITAL | Age: 74
End: 2019-02-28
Attending: INTERNAL MEDICINE
Payer: MEDICARE

## 2019-02-28 DIAGNOSIS — N18.2 CKD (CHRONIC KIDNEY DISEASE), STAGE II: ICD-10-CM

## 2019-02-28 DIAGNOSIS — E03.9 HYPOTHYROIDISM, UNSPECIFIED TYPE: ICD-10-CM

## 2019-02-28 LAB
ANION GAP SERPL CALC-SCNC: 9 MMOL/L (ref 0–18)
BUN BLD-MCNC: 18 MG/DL (ref 7–18)
BUN/CREAT SERPL: 15.4 (ref 10–20)
CALCIUM BLD-MCNC: 9.5 MG/DL (ref 8.5–10.1)
CHLORIDE SERPL-SCNC: 108 MMOL/L (ref 98–107)
CO2 SERPL-SCNC: 24 MMOL/L (ref 21–32)
CREAT BLD-MCNC: 1.17 MG/DL (ref 0.7–1.3)
GLUCOSE BLD-MCNC: 146 MG/DL (ref 70–99)
OSMOLALITY SERPL CALC.SUM OF ELEC: 297 MOSM/KG (ref 275–295)
POTASSIUM SERPL-SCNC: 4.5 MMOL/L (ref 3.5–5.1)
SODIUM SERPL-SCNC: 141 MMOL/L (ref 136–145)
T4 FREE SERPL-MCNC: 1.1 NG/DL (ref 0.8–1.7)
TSI SER-ACNC: 3.05 MIU/ML (ref 0.36–3.74)

## 2019-02-28 PROCEDURE — 84443 ASSAY THYROID STIM HORMONE: CPT

## 2019-02-28 PROCEDURE — 84439 ASSAY OF FREE THYROXINE: CPT

## 2019-02-28 PROCEDURE — 80048 BASIC METABOLIC PNL TOTAL CA: CPT

## 2019-02-28 PROCEDURE — 36415 COLL VENOUS BLD VENIPUNCTURE: CPT

## 2019-03-04 ENCOUNTER — OFFICE VISIT (OUTPATIENT)
Dept: ENDOCRINOLOGY CLINIC | Facility: CLINIC | Age: 74
End: 2019-03-04
Payer: MEDICARE

## 2019-03-04 ENCOUNTER — OFFICE VISIT (OUTPATIENT)
Dept: NEPHROLOGY | Facility: CLINIC | Age: 74
End: 2019-03-04
Payer: MEDICARE

## 2019-03-04 VITALS
WEIGHT: 312 LBS | BODY MASS INDEX: 44 KG/M2 | SYSTOLIC BLOOD PRESSURE: 145 MMHG | DIASTOLIC BLOOD PRESSURE: 73 MMHG | HEART RATE: 87 BPM

## 2019-03-04 VITALS
DIASTOLIC BLOOD PRESSURE: 74 MMHG | HEART RATE: 97 BPM | WEIGHT: 312.19 LBS | BODY MASS INDEX: 43.71 KG/M2 | TEMPERATURE: 98 F | SYSTOLIC BLOOD PRESSURE: 148 MMHG | HEIGHT: 71 IN

## 2019-03-04 DIAGNOSIS — N18.2 CKD (CHRONIC KIDNEY DISEASE), STAGE II: Primary | ICD-10-CM

## 2019-03-04 DIAGNOSIS — E11.65 UNCONTROLLED TYPE 2 DIABETES MELLITUS WITH HYPERGLYCEMIA (HCC): Primary | ICD-10-CM

## 2019-03-04 LAB
CARTRIDGE LOT#: ABNORMAL NUMERIC
GLUCOSE BLOOD: 235
HEMOGLOBIN A1C: 7.8 % (ref 4.3–5.6)
TEST STRIP LOT #: NORMAL NUMERIC

## 2019-03-04 PROCEDURE — 36416 COLLJ CAPILLARY BLOOD SPEC: CPT | Performed by: INTERNAL MEDICINE

## 2019-03-04 PROCEDURE — 83036 HEMOGLOBIN GLYCOSYLATED A1C: CPT | Performed by: INTERNAL MEDICINE

## 2019-03-04 PROCEDURE — 82962 GLUCOSE BLOOD TEST: CPT | Performed by: INTERNAL MEDICINE

## 2019-03-04 PROCEDURE — 99213 OFFICE O/P EST LOW 20 MIN: CPT | Performed by: INTERNAL MEDICINE

## 2019-03-04 PROCEDURE — G0463 HOSPITAL OUTPT CLINIC VISIT: HCPCS | Performed by: INTERNAL MEDICINE

## 2019-03-04 NOTE — PROGRESS NOTES
Name: Salma Flores  Date: 3/4/2019    Referring Physician: No ref. provider found    HISTORY OF PRESENT ILLNESS   Salma Flores is a 68year old male who presents for diabetes mellitus, diagnosed at age 49.       Since last visit he has been on vac 1  •  allopurinol 100 MG Oral Tab, Take 1 tablet (100 mg total) by mouth once daily. , Disp: 90 tablet, Rfl: 1  •  losartan 100 MG Oral Tab, Take 1 tablet (100 mg total) by mouth daily. , Disp: 90 tablet, Rfl: 1  •  Levothyroxine Sodium 50 MCG Oral Tab, Take kidney disease, stage II (mild)    • Dyslipidemia    • Essential hypertension    • Hearing loss    • History of CVA (cerebrovascular accident)     Multiple, asymptomatic   • Hyperuricemia    • Morbid obesity ([facility] [address]    • Obstructive sleep apnea     On nightl recommend 45gm per meal or 135gm per day  -Continue Trulicity   -Continue Glipizide 10mg PO BID   -Continue Metformin ER 1000mg PO BID  -Recommend optho follow up  -Renal function improved  -BP normal at home - will follow up with Dr. Becki Joseph  -Normal lip

## 2019-03-04 NOTE — PROGRESS NOTES
Progress Note     Zack Meneses is a 72 yrs old male with pmh of DM x 20 yrs, HTN X 20 yrs, sleep apnea, morbidly obese on CPAP, right hip replacement, CVA who presented today for follow up     Patient baseline creatinine is around 1.0-1.1 till 2013, • TOTAL HIP REPLACEMENT Right 01/2013   • VASECTOMY  1982           Medications (Active prior to today's visit):    Current Outpatient Medications: AmLODIPine Besylate 10 MG Oral Tab Take 1 tablet (10 mg total) by mouth daily.  Disp: 90 tablet Rfl: 3   M dyspnea and wheezing  Gastrointestinal:  Negative for abdominal pain, constipation  Genitourinary:  Negative for dysuria and hematuria  Hema/Lymph:  Negative for easy bleeding and easy bruising  Integumentary:  Negative for pruritus and rash  Musculoskelet Visit:  Orders Placed This Encounter      Microalb/Creat Ratio, Random Urine      Urinalysis, Routine      Protein,Total,Urine, Random [E]      Meds This Visit:  Requested Prescriptions      No prescriptions requested or ordered in this encounter       Toby Matthew

## 2019-03-12 ENCOUNTER — MED REC SCAN ONLY (OUTPATIENT)
Dept: INTERNAL MEDICINE CLINIC | Facility: CLINIC | Age: 74
End: 2019-03-12

## 2019-03-19 ENCOUNTER — OFFICE VISIT (OUTPATIENT)
Dept: PULMONOLOGY | Facility: CLINIC | Age: 74
End: 2019-03-19
Payer: MEDICARE

## 2019-03-19 VITALS
WEIGHT: 310 LBS | HEART RATE: 80 BPM | BODY MASS INDEX: 43.4 KG/M2 | OXYGEN SATURATION: 97 % | SYSTOLIC BLOOD PRESSURE: 144 MMHG | HEIGHT: 71 IN | DIASTOLIC BLOOD PRESSURE: 82 MMHG

## 2019-03-19 DIAGNOSIS — G47.33 OBSTRUCTIVE SLEEP APNEA: Primary | ICD-10-CM

## 2019-03-19 PROCEDURE — 99213 OFFICE O/P EST LOW 20 MIN: CPT | Performed by: INTERNAL MEDICINE

## 2019-03-19 PROCEDURE — G0463 HOSPITAL OUTPT CLINIC VISIT: HCPCS | Performed by: INTERNAL MEDICINE

## 2019-03-19 NOTE — PROGRESS NOTES
The patient is a 71-year-old male who I know well from prior evaluation comes in now for follow-up.   He has obstructive sleep apnea and is doing very well on CPAP 10 cm water pressure with downloaded data 9 hours and 6 minutes per night average daily usage

## 2019-04-01 ENCOUNTER — TELEPHONE (OUTPATIENT)
Dept: PULMONOLOGY | Facility: CLINIC | Age: 74
End: 2019-04-01

## 2019-04-02 NOTE — TELEPHONE ENCOUNTER
Last OV 3/19/19 and mask ordered at this OV. [facility]. Order faxed to [facility] [phone]. Fax confirmation received. Pt informed of this and given [facility] phone # [phone]. Pt verbalized understanding.

## 2019-04-05 ENCOUNTER — HOSPITAL ENCOUNTER (OUTPATIENT)
Dept: ENDOCRINOLOGY | Facility: HOSPITAL | Age: 74
Discharge: HOME | End: 2019-04-05
Attending: INTERNAL MEDICINE
Payer: MEDICARE

## 2019-04-05 DIAGNOSIS — E11.9 TYPE 2 DIABETES MELLITUS WITHOUT COMPLICATION, WITHOUT LONG-TERM CURRENT USE OF INSULIN (HCC): Primary | ICD-10-CM

## 2019-04-05 PROCEDURE — 97802 MEDICAL NUTRITION INDIV IN: CPT

## 2019-04-05 NOTE — ADDENDUM NOTE
Encounter addended by: Noble Pride RD on: 4/5/2019 2:52 PM   Actions taken: Charge Capture section accepted

## 2019-04-05 NOTE — PROGRESS NOTES
Medical Nutrition Therapy Assessment    Manuel Gee 12/24/1945 was seen for individual Diabetic Medical Nutrition Therapy:    Date: 4/5/2019   Start time: 10:30 A  End time: 11:30 A    Did not want to attend classes due to time commitment.     Assessmatt Blood (ONETOUCH ULTRA BLUE) In Vitro Strip, test daily, Disp: , Rfl:     Labs:  Lab Results   Component Value Date    A1C 7.8 (A) 03/04/2019    A1C 7.8 (A) 09/06/2018    CHOLEST 146 09/07/2018    CHOLEST 151 12/07/2017    LDL 76 09/07/2018    LDL 78 12/07/ diabetes plate method with focus on balanced macronutrient consumption, including identifying foods that are carbohydrates, lean protein, non-starchy vegetables, and heart healthy fats   [] stressed importance of carbohydrate consistency in each meal   [x] sugar goals: less than 130 mg/dl in the morning and less than 180 mg/dl 2 hours after meals.   Keep glucose tabs or fast acting carbohydrates with you for treatment of lows; for blood glucose levels less than 70 mg/dl, take 15 grams of fast acting carbohydr

## 2019-05-01 ENCOUNTER — HOSPITAL ENCOUNTER (OUTPATIENT)
Dept: ENDOCRINOLOGY | Facility: HOSPITAL | Age: 74
Discharge: HOME | End: 2019-05-01
Attending: INTERNAL MEDICINE
Payer: MEDICARE

## 2019-05-01 DIAGNOSIS — E11.9 TYPE 2 DIABETES MELLITUS WITHOUT COMPLICATION, WITHOUT LONG-TERM CURRENT USE OF INSULIN (HCC): Primary | ICD-10-CM

## 2019-05-01 PROCEDURE — 97803 MED NUTRITION INDIV SUBSEQ: CPT

## 2019-05-01 RX ORDER — BLOOD-GLUCOSE METER
1 EACH MISCELLANEOUS ONCE
Qty: 1 KIT | Refills: 0 | Status: SHIPPED | OUTPATIENT
Start: 2019-05-01 | End: 2019-05-08

## 2019-05-01 RX ORDER — BLOOD SUGAR DIAGNOSTIC
STRIP MISCELLANEOUS
Qty: 100 STRIP | Refills: 1 | Status: SHIPPED | OUTPATIENT
Start: 2019-05-01 | End: 2019-05-08

## 2019-05-01 NOTE — ADDENDUM NOTE
Encounter addended by: Deepa Parkinson RD on: 5/1/2019 5:40 PM   Actions taken: Pharmacy for encounter modified, Order list changed, Diagnosis association updated

## 2019-05-01 NOTE — PROGRESS NOTES
Medical Nutrition Therapy FOLLOW UP VISIT     Oseas Bernadette 12/24/1945 was seen for individual Diabetic Medical Nutrition Therapy:     Date: 5/1/2019                          Start time: 3:30 P        End time: 4:30 P     Did not want to attend classes (VITAMIN C) 500 MG Oral Tab, Take 500 mg by mouth every morning.  , Disp: , Rfl:   •  Glucose Blood (ONETOUCH ULTRA BLUE) In Vitro Strip, test daily, Disp: , Rfl:      Labs:        Lab Results   Component Value Date     A1C 7.8 (A) 03/04/2019     A1C 7.8 ( management, and BP management as related to diabetes   [x] discussed basic meal planning guidelines for diabetes regular mealtime, limited concentrated sweets.  Worked on establishing eating pattern/timing of meals and snacks    [x] discussed in depth meal compliance/understanding, blood sugar monitoring compliance, hemoglobin A1c and weight trends     Evaluation  Behavioral Goal(s) selected:   Healthy Eating, Being Active and Monitoring Blood Glucose     Support Plan:  Weight Management programs and The use

## 2019-05-07 ENCOUNTER — TELEPHONE (OUTPATIENT)
Dept: ENDOCRINOLOGY | Facility: HOSPITAL | Age: 74
End: 2019-05-07

## 2019-05-07 NOTE — TELEPHONE ENCOUNTER
Patient and wife left voicemail stating that Heaven Blevins sent prescription for 1 touch Vario kit to pharmacy. However, [facility] no longer carries it. Need prescription to be sent to [facility] on [address] [place] [address] in [place] [address]. Call patient at #[phone] with any questions. 5/8/19 @ 9:57am Patient called a second time today to touch base with Heaven Blevins, Dietitian.

## 2019-05-08 RX ORDER — BLOOD-GLUCOSE METER
1 EACH MISCELLANEOUS ONCE
Qty: 1 KIT | Refills: 0 | Status: SHIPPED | OUTPATIENT
Start: 2019-05-08 | End: 2019-05-08

## 2019-05-08 RX ORDER — BLOOD SUGAR DIAGNOSTIC
STRIP MISCELLANEOUS
Qty: 100 STRIP | Refills: 1 | Status: SHIPPED | OUTPATIENT
Start: 2019-05-08 | End: 2019-12-06 | Stop reason: ALTCHOICE

## 2019-05-08 NOTE — ADDENDUM NOTE
Encounter addended by: Desire Crouhc RD on: 5/8/2019 12:28 PM   Actions taken: Pharmacy for encounter modified, Order list changed, Diagnosis association updated

## 2019-05-19 DIAGNOSIS — E79.0 HYPERURICEMIA: ICD-10-CM

## 2019-05-19 DIAGNOSIS — E78.5 HYPERLIPIDEMIA LDL GOAL <100: ICD-10-CM

## 2019-05-19 DIAGNOSIS — E03.9 HYPOTHYROIDISM, UNSPECIFIED TYPE: ICD-10-CM

## 2019-05-20 RX ORDER — FENOFIBRATE 160 MG/1
160 TABLET ORAL DAILY
Qty: 90 TABLET | Refills: 1 | Status: SHIPPED | OUTPATIENT
Start: 2019-05-20 | End: 2019-12-06

## 2019-05-20 RX ORDER — LEVOTHYROXINE SODIUM 0.05 MG/1
50 TABLET ORAL
Qty: 90 TABLET | Refills: 1 | Status: SHIPPED | OUTPATIENT
Start: 2019-05-20 | End: 2019-12-06

## 2019-05-20 RX ORDER — METFORMIN HYDROCHLORIDE 500 MG/1
TABLET, EXTENDED RELEASE ORAL
Qty: 360 TABLET | Refills: 1 | Status: SHIPPED | OUTPATIENT
Start: 2019-05-20 | End: 2019-12-06

## 2019-05-20 RX ORDER — LOSARTAN POTASSIUM 100 MG/1
100 TABLET ORAL DAILY
Qty: 90 TABLET | Refills: 1 | Status: SHIPPED | OUTPATIENT
Start: 2019-05-20 | End: 2019-12-06

## 2019-05-20 RX ORDER — ALLOPURINOL 100 MG/1
100 TABLET ORAL
Qty: 90 TABLET | Refills: 1 | Status: SHIPPED | OUTPATIENT
Start: 2019-05-20 | End: 2019-12-06

## 2019-05-20 RX ORDER — PRAVASTATIN SODIUM 40 MG
40 TABLET ORAL NIGHTLY
Qty: 90 TABLET | Refills: 1 | Status: SHIPPED | OUTPATIENT
Start: 2019-05-20 | End: 2019-12-06

## 2019-05-20 RX ORDER — GLIPIZIDE 10 MG/1
10 TABLET, FILM COATED, EXTENDED RELEASE ORAL 2 TIMES DAILY
Qty: 180 TABLET | Refills: 1 | Status: SHIPPED | OUTPATIENT
Start: 2019-05-20 | End: 2019-12-06

## 2019-07-30 RX ORDER — DULAGLUTIDE 1.5 MG/.5ML
INJECTION, SOLUTION SUBCUTANEOUS
Qty: 6 ML | Refills: 3 | Status: SHIPPED | OUTPATIENT
Start: 2019-07-30 | End: 2020-07-27

## 2019-09-03 ENCOUNTER — TELEPHONE (OUTPATIENT)
Dept: ENDOCRINOLOGY CLINIC | Facility: CLINIC | Age: 74
End: 2019-09-03

## 2019-09-03 DIAGNOSIS — E03.9 HYPOTHYROIDISM, UNSPECIFIED TYPE: Primary | ICD-10-CM

## 2019-09-03 DIAGNOSIS — E11.8 CONTROLLED TYPE 2 DIABETES MELLITUS WITH COMPLICATION, WITH LONG-TERM CURRENT USE OF INSULIN (HCC): ICD-10-CM

## 2019-09-03 DIAGNOSIS — Z79.4 CONTROLLED TYPE 2 DIABETES MELLITUS WITH COMPLICATION, WITH LONG-TERM CURRENT USE OF INSULIN (HCC): ICD-10-CM

## 2019-09-03 NOTE — TELEPHONE ENCOUNTER
F/u for DM only. Last PSA screen ordered 2017 by PCP. Refer to Primary care provider or ok to order?

## 2019-09-04 NOTE — TELEPHONE ENCOUNTER
He actually does need labs - CMP, lipids, MAB, TSH, FT4. Thanks. But correct PCP to order PSA. Thanks.

## 2019-09-04 NOTE — TELEPHONE ENCOUNTER
Tomasa Barrett understanding that PSA screen should be ordered by PCP, however he states he currently does not have a PCP. \"In the works\" to set up appt with new provider. He will plan to complete labs ordered by [facility] prior to follow up appt.     SHALOM Lu

## 2019-09-04 NOTE — TELEPHONE ENCOUNTER
Just to confirm, he'll need to ask PCP for PSA screen - and no other labs to complete for you prior to follow up appt?

## 2019-09-14 ENCOUNTER — APPOINTMENT (OUTPATIENT)
Dept: LAB | Facility: HOSPITAL | Age: 74
End: 2019-09-14
Attending: INTERNAL MEDICINE
Payer: MEDICARE

## 2019-09-14 DIAGNOSIS — E03.9 HYPOTHYROIDISM, UNSPECIFIED TYPE: ICD-10-CM

## 2019-09-14 DIAGNOSIS — E11.8 CONTROLLED TYPE 2 DIABETES MELLITUS WITH COMPLICATION, WITH LONG-TERM CURRENT USE OF INSULIN (HCC): ICD-10-CM

## 2019-09-14 DIAGNOSIS — N18.2 CKD (CHRONIC KIDNEY DISEASE), STAGE II: ICD-10-CM

## 2019-09-14 DIAGNOSIS — Z79.4 CONTROLLED TYPE 2 DIABETES MELLITUS WITH COMPLICATION, WITH LONG-TERM CURRENT USE OF INSULIN (HCC): ICD-10-CM

## 2019-09-14 LAB
ALBUMIN SERPL-MCNC: 4.1 G/DL (ref 3.4–5)
ALBUMIN/GLOB SERPL: 1.3 {RATIO} (ref 1–2)
ALP LIVER SERPL-CCNC: 43 U/L (ref 45–117)
ALT SERPL-CCNC: 53 U/L (ref 16–61)
ANION GAP SERPL CALC-SCNC: 8 MMOL/L (ref 0–18)
AST SERPL-CCNC: 35 U/L (ref 15–37)
BILIRUB SERPL-MCNC: 0.4 MG/DL (ref 0.1–2)
BILIRUB UR QL: NEGATIVE
BUN BLD-MCNC: 17 MG/DL (ref 7–18)
BUN/CREAT SERPL: 15.9 (ref 10–20)
CALCIUM BLD-MCNC: 9.3 MG/DL (ref 8.5–10.1)
CHLORIDE SERPL-SCNC: 111 MMOL/L (ref 98–112)
CHOLEST SMN-MCNC: 145 MG/DL (ref ?–200)
CLARITY UR: CLEAR
CO2 SERPL-SCNC: 26 MMOL/L (ref 21–32)
COLOR UR: YELLOW
CREAT BLD-MCNC: 1.07 MG/DL (ref 0.7–1.3)
CREAT UR-SCNC: 139 MG/DL
GLOBULIN PLAS-MCNC: 3.1 G/DL (ref 2.8–4.4)
GLUCOSE BLD-MCNC: 152 MG/DL (ref 70–99)
GLUCOSE UR-MCNC: NEGATIVE MG/DL
HDLC SERPL-MCNC: 34 MG/DL (ref 40–59)
HGB UR QL STRIP.AUTO: NEGATIVE
KETONES UR-MCNC: NEGATIVE MG/DL
LDLC SERPL CALC-MCNC: 85 MG/DL (ref ?–100)
LEUKOCYTE ESTERASE UR QL STRIP.AUTO: NEGATIVE
M PROTEIN MFR SERPL ELPH: 7.2 G/DL (ref 6.4–8.2)
MICROALBUMIN UR-MCNC: 6.85 MG/DL
MICROALBUMIN/CREAT 24H UR-RTO: 49.3 UG/MG (ref ?–30)
NITRITE UR QL STRIP.AUTO: NEGATIVE
NONHDLC SERPL-MCNC: 111 MG/DL (ref ?–130)
OSMOLALITY SERPL CALC.SUM OF ELEC: 305 MOSM/KG (ref 275–295)
PATIENT FASTING: YES
PATIENT FASTING: YES
PH UR: 5 [PH] (ref 5–8)
POTASSIUM SERPL-SCNC: 4.5 MMOL/L (ref 3.5–5.1)
PROT UR-MCNC: 22.1 MG/DL
PROT UR-MCNC: NEGATIVE MG/DL
RBC #/AREA URNS AUTO: 1 /HPF
SODIUM SERPL-SCNC: 145 MMOL/L (ref 136–145)
SP GR UR STRIP: 1.02 (ref 1–1.03)
T4 FREE SERPL-MCNC: 1 NG/DL (ref 0.8–1.7)
TRIGL SERPL-MCNC: 131 MG/DL (ref 30–149)
TSI SER-ACNC: 2.17 MIU/ML (ref 0.36–3.74)
UROBILINOGEN UR STRIP-ACNC: <2
VIT C UR-MCNC: 40 MG/DL
VLDLC SERPL CALC-MCNC: 26 MG/DL (ref 0–30)
WBC #/AREA URNS AUTO: 1 /HPF

## 2019-09-14 PROCEDURE — 82043 UR ALBUMIN QUANTITATIVE: CPT

## 2019-09-14 PROCEDURE — 82570 ASSAY OF URINE CREATININE: CPT

## 2019-09-14 PROCEDURE — 81003 URINALYSIS AUTO W/O SCOPE: CPT

## 2019-09-14 PROCEDURE — 84443 ASSAY THYROID STIM HORMONE: CPT

## 2019-09-14 PROCEDURE — 80061 LIPID PANEL: CPT

## 2019-09-14 PROCEDURE — 84439 ASSAY OF FREE THYROXINE: CPT

## 2019-09-14 PROCEDURE — 80053 COMPREHEN METABOLIC PANEL: CPT

## 2019-09-14 PROCEDURE — 84156 ASSAY OF PROTEIN URINE: CPT

## 2019-09-14 PROCEDURE — 36415 COLL VENOUS BLD VENIPUNCTURE: CPT

## 2019-09-19 ENCOUNTER — OFFICE VISIT (OUTPATIENT)
Dept: ENDOCRINOLOGY CLINIC | Facility: CLINIC | Age: 74
End: 2019-09-19
Payer: MEDICARE

## 2019-09-19 ENCOUNTER — OFFICE VISIT (OUTPATIENT)
Dept: NEPHROLOGY | Facility: CLINIC | Age: 74
End: 2019-09-19
Payer: MEDICARE

## 2019-09-19 VITALS
SYSTOLIC BLOOD PRESSURE: 130 MMHG | BODY MASS INDEX: 43 KG/M2 | HEART RATE: 74 BPM | WEIGHT: 308.63 LBS | DIASTOLIC BLOOD PRESSURE: 79 MMHG

## 2019-09-19 VITALS
SYSTOLIC BLOOD PRESSURE: 140 MMHG | WEIGHT: 308 LBS | DIASTOLIC BLOOD PRESSURE: 71 MMHG | BODY MASS INDEX: 43 KG/M2 | HEART RATE: 90 BPM | TEMPERATURE: 99 F

## 2019-09-19 DIAGNOSIS — E11.65 UNCONTROLLED TYPE 2 DIABETES MELLITUS WITH HYPERGLYCEMIA (HCC): Primary | ICD-10-CM

## 2019-09-19 DIAGNOSIS — N18.2 CKD (CHRONIC KIDNEY DISEASE), STAGE II: Primary | ICD-10-CM

## 2019-09-19 DIAGNOSIS — I10 ESSENTIAL HYPERTENSION: ICD-10-CM

## 2019-09-19 LAB
CARTRIDGE LOT#: ABNORMAL NUMERIC
GLUCOSE BLOOD: 133
HEMOGLOBIN A1C: 7.2 % (ref 4.3–5.6)
TEST STRIP LOT #: NORMAL NUMERIC

## 2019-09-19 PROCEDURE — 82962 GLUCOSE BLOOD TEST: CPT | Performed by: INTERNAL MEDICINE

## 2019-09-19 PROCEDURE — G0463 HOSPITAL OUTPT CLINIC VISIT: HCPCS | Performed by: INTERNAL MEDICINE

## 2019-09-19 PROCEDURE — 99213 OFFICE O/P EST LOW 20 MIN: CPT | Performed by: INTERNAL MEDICINE

## 2019-09-19 PROCEDURE — 83036 HEMOGLOBIN GLYCOSYLATED A1C: CPT | Performed by: INTERNAL MEDICINE

## 2019-09-19 PROCEDURE — 36416 COLLJ CAPILLARY BLOOD SPEC: CPT | Performed by: INTERNAL MEDICINE

## 2019-09-19 NOTE — PROGRESS NOTES
Name: Irene Joe  Date: 9/19/2019    Referring Physician: No ref. provider found    HISTORY OF PRESENT ILLNESS   Irene Joe is a 68year old male who presents for diabetes mellitus, diagnosed at age 49.       Prior HbA, C or glycohemoglobin we glipiZIDE ER 10 MG Oral Tablet 24 Hr, Take 1 tablet (10 mg total) by mouth 2 (two) times daily. , Disp: 180 tablet, Rfl: 1  •  Fenofibrate 160 MG Oral Tab, Take 1 tablet (160 mg total) by mouth daily. , Disp: 90 tablet, Rfl: 1  •  Pravastatin Sodium [facility] coffee, soda, 3 cups daily      Medical History:   Past Medical History:   Diagnosis Date   • Aortic atherosclerosis (HCC)     CT scan 4-14   • ASD (atrial septal defect)     WILD 9-16   • Chronic kidney disease, stage II (mild)    • Dyslipidemia    • Fara diabetes include retinopathy, neuropathy, nephropathy and cardiovascular disease  -Discussed importance of SBGM  -Discussed importance of low CHO diet, recommend 45gm per meal or 135gm per day  -Continue Trulicity   -Continue Glipizide 10mg PO BID   -Tiana Chavez

## 2019-09-19 NOTE — PROGRESS NOTES
Progress Note     Alejandra Gaming is a 68 yrs old male with pmh of DM x 20 yrs, HTN X 20 yrs, CKD stage II, sleep apnea, morbidly obese on CPAP, right hip replacement, CVA who presented today for follow up     Patient baseline creatinine is around 1.0- TOTAL HIP REPLACEMENT Right 01/2013   • VASECTOMY  1982           Medications (Active prior to today's visit):    Current Outpatient Medications:  TRULICITY 1.5 JG/2.1NM Subcutaneous Solution Pen-injector INJECT 0.5ML (=1.5 MG)     SUBCUTANEOUSLY EVERY 7 D irritability and lethargy  ENMT:  Negative for ear drainage, hearing loss and nasal drainage  Eyes:  Negative for eye discharge and vision loss  Cardiovascular:  Negative for chest pain, sob  Respiratory:  Negative for cough, dyspnea and wheezing  [place] Prost HTN:    - on amlodipine 10mg daily and losartan 100mg daily   - home BP in 130s range  - try to loose weight     Follow up in 6 - 8 months. Orders This Visit:  No orders of the defined types were placed in this encounter.       Meds This Visit:  Request

## 2019-12-06 ENCOUNTER — OFFICE VISIT (OUTPATIENT)
Dept: INTERNAL MEDICINE CLINIC | Facility: CLINIC | Age: 74
End: 2019-12-06
Payer: MEDICARE

## 2019-12-06 VITALS
RESPIRATION RATE: 18 BRPM | SYSTOLIC BLOOD PRESSURE: 117 MMHG | DIASTOLIC BLOOD PRESSURE: 76 MMHG | WEIGHT: 308 LBS | BODY MASS INDEX: 43.12 KG/M2 | HEIGHT: 71 IN | HEART RATE: 86 BPM

## 2019-12-06 DIAGNOSIS — E78.5 HYPERLIPIDEMIA LDL GOAL <100: ICD-10-CM

## 2019-12-06 DIAGNOSIS — E03.9 HYPOTHYROIDISM, UNSPECIFIED TYPE: ICD-10-CM

## 2019-12-06 DIAGNOSIS — I10 ESSENTIAL HYPERTENSION: ICD-10-CM

## 2019-12-06 DIAGNOSIS — Z28.21 IMMUNIZATION NOT CARRIED OUT BECAUSE OF PATIENT REFUSAL: ICD-10-CM

## 2019-12-06 DIAGNOSIS — I70.0 AORTIC ATHEROSCLEROSIS (HCC): ICD-10-CM

## 2019-12-06 DIAGNOSIS — E79.0 HYPERURICEMIA: ICD-10-CM

## 2019-12-06 DIAGNOSIS — E78.5 DYSLIPIDEMIA: ICD-10-CM

## 2019-12-06 DIAGNOSIS — E66.01 MORBID OBESITY (HCC): ICD-10-CM

## 2019-12-06 DIAGNOSIS — G47.33 OBSTRUCTIVE SLEEP APNEA: ICD-10-CM

## 2019-12-06 DIAGNOSIS — Z12.5 ENCOUNTER FOR SCREENING FOR MALIGNANT NEOPLASM OF PROSTATE: ICD-10-CM

## 2019-12-06 DIAGNOSIS — E11.9 TYPE 2 DIABETES MELLITUS WITHOUT COMPLICATION, WITHOUT LONG-TERM CURRENT USE OF INSULIN (HCC): ICD-10-CM

## 2019-12-06 DIAGNOSIS — Z12.11 COLON CANCER SCREENING: ICD-10-CM

## 2019-12-06 DIAGNOSIS — Q21.1 ASD (ATRIAL SEPTAL DEFECT): ICD-10-CM

## 2019-12-06 DIAGNOSIS — Z86.73 HISTORY OF CVA (CEREBROVASCULAR ACCIDENT): ICD-10-CM

## 2019-12-06 DIAGNOSIS — Z00.00 ENCOUNTER FOR ANNUAL HEALTH EXAMINATION: Primary | ICD-10-CM

## 2019-12-06 DIAGNOSIS — N18.2 CHRONIC KIDNEY DISEASE, STAGE II (MILD): ICD-10-CM

## 2019-12-06 DIAGNOSIS — M15.9 PRIMARY OSTEOARTHRITIS INVOLVING MULTIPLE JOINTS: ICD-10-CM

## 2019-12-06 PROCEDURE — G0439 PPPS, SUBSEQ VISIT: HCPCS | Performed by: INTERNAL MEDICINE

## 2019-12-06 RX ORDER — LOSARTAN POTASSIUM 100 MG/1
100 TABLET ORAL DAILY
Qty: 90 TABLET | Refills: 1 | Status: SHIPPED | OUTPATIENT
Start: 2019-12-06 | End: 2019-12-06

## 2019-12-06 RX ORDER — LOSARTAN POTASSIUM 100 MG/1
100 TABLET ORAL DAILY
Qty: 90 TABLET | Refills: 1 | Status: SHIPPED | OUTPATIENT
Start: 2019-12-06 | End: 2020-09-09

## 2019-12-06 RX ORDER — FENOFIBRATE 160 MG/1
TABLET ORAL
Qty: 90 TABLET | Refills: 1 | Status: SHIPPED | OUTPATIENT
Start: 2019-12-06 | End: 2019-12-06

## 2019-12-06 RX ORDER — GLIPIZIDE 10 MG/1
10 TABLET, FILM COATED, EXTENDED RELEASE ORAL 2 TIMES DAILY
Qty: 180 TABLET | Refills: 1 | Status: SHIPPED | OUTPATIENT
Start: 2019-12-06 | End: 2019-12-06

## 2019-12-06 RX ORDER — METFORMIN HYDROCHLORIDE 500 MG/1
TABLET, EXTENDED RELEASE ORAL
Qty: 360 TABLET | Refills: 1 | Status: SHIPPED | OUTPATIENT
Start: 2019-12-06 | End: 2020-07-16 | Stop reason: DRUGHIGH

## 2019-12-06 RX ORDER — PRAVASTATIN SODIUM 40 MG
40 TABLET ORAL NIGHTLY
Qty: 90 TABLET | Refills: 3 | Status: SHIPPED | OUTPATIENT
Start: 2019-12-06 | End: 2020-09-09

## 2019-12-06 RX ORDER — LOSARTAN POTASSIUM 100 MG/1
TABLET ORAL
Qty: 90 TABLET | Refills: 1 | Status: SHIPPED | OUTPATIENT
Start: 2019-12-06 | End: 2019-12-06

## 2019-12-06 RX ORDER — ALLOPURINOL 100 MG/1
TABLET ORAL
Qty: 90 TABLET | Refills: 1 | Status: SHIPPED | OUTPATIENT
Start: 2019-12-06 | End: 2019-12-06

## 2019-12-06 RX ORDER — LEVOTHYROXINE SODIUM 50 MCG
TABLET ORAL
Qty: 90 TABLET | Refills: 1 | Status: SHIPPED | OUTPATIENT
Start: 2019-12-06 | End: 2019-12-06

## 2019-12-06 RX ORDER — ALLOPURINOL 100 MG/1
100 TABLET ORAL
Qty: 90 TABLET | Refills: 1 | Status: SHIPPED | OUTPATIENT
Start: 2019-12-06 | End: 2019-12-06

## 2019-12-06 RX ORDER — GLIPIZIDE 10 MG/1
TABLET, FILM COATED, EXTENDED RELEASE ORAL
Qty: 180 TABLET | Refills: 1 | Status: SHIPPED | OUTPATIENT
Start: 2019-12-06 | End: 2019-12-06

## 2019-12-06 RX ORDER — LEVOTHYROXINE SODIUM 0.05 MG/1
50 TABLET ORAL
Qty: 90 TABLET | Refills: 1 | Status: SHIPPED | OUTPATIENT
Start: 2019-12-06 | End: 2019-12-06

## 2019-12-06 RX ORDER — ALLOPURINOL 100 MG/1
100 TABLET ORAL DAILY
Qty: 90 TABLET | Refills: 3 | Status: SHIPPED | OUTPATIENT
Start: 2019-12-06 | End: 2020-09-09

## 2019-12-06 RX ORDER — PRAVASTATIN SODIUM 40 MG
40 TABLET ORAL NIGHTLY
Qty: 90 TABLET | Refills: 1 | Status: SHIPPED | OUTPATIENT
Start: 2019-12-06 | End: 2019-12-06

## 2019-12-06 RX ORDER — AMLODIPINE BESYLATE 10 MG/1
TABLET ORAL
Qty: 90 TABLET | Refills: 3 | OUTPATIENT
Start: 2019-12-06

## 2019-12-06 RX ORDER — FENOFIBRATE 160 MG/1
160 TABLET ORAL DAILY
Qty: 90 TABLET | Refills: 1 | Status: SHIPPED | OUTPATIENT
Start: 2019-12-06 | End: 2019-12-06

## 2019-12-06 RX ORDER — METFORMIN HYDROCHLORIDE 500 MG/1
TABLET, EXTENDED RELEASE ORAL
Qty: 360 TABLET | Refills: 1 | Status: SHIPPED | OUTPATIENT
Start: 2019-12-06 | End: 2019-12-06

## 2019-12-06 RX ORDER — GLIPIZIDE 10 MG/1
10 TABLET, FILM COATED, EXTENDED RELEASE ORAL 2 TIMES DAILY
Qty: 180 TABLET | Refills: 3 | Status: SHIPPED | OUTPATIENT
Start: 2019-12-06 | End: 2020-09-09

## 2019-12-06 RX ORDER — AMLODIPINE BESYLATE 10 MG/1
10 TABLET ORAL DAILY
Qty: 90 TABLET | Refills: 3 | Status: CANCELLED | OUTPATIENT
Start: 2019-12-06

## 2019-12-06 RX ORDER — LEVOTHYROXINE SODIUM 0.05 MG/1
50 TABLET ORAL
Qty: 90 TABLET | Refills: 3 | Status: SHIPPED | OUTPATIENT
Start: 2019-12-06 | End: 2020-09-09

## 2019-12-06 RX ORDER — PRAVASTATIN SODIUM 40 MG
TABLET ORAL
Qty: 90 TABLET | Refills: 1 | Status: SHIPPED | OUTPATIENT
Start: 2019-12-06 | End: 2019-12-06

## 2019-12-06 RX ORDER — AMLODIPINE BESYLATE 10 MG/1
10 TABLET ORAL DAILY
Qty: 90 TABLET | Refills: 3 | Status: SHIPPED | OUTPATIENT
Start: 2019-12-06 | End: 2020-03-23 | Stop reason: SINTOL

## 2019-12-06 RX ORDER — FENOFIBRATE 160 MG/1
160 TABLET ORAL
Qty: 90 TABLET | Refills: 3 | Status: SHIPPED | OUTPATIENT
Start: 2019-12-06 | End: 2020-09-09

## 2019-12-06 NOTE — PATIENT INSTRUCTIONS
[facility] SCREENING SCHEDULE   Tests on this list are recommended by your physician but may not be covered, or covered at this frequency, by your insurer. Please check with your insurance carrier before scheduling to verify coverage.     PREVENT 10 years- more often if abnormal Colonoscopy due on 12/06/2018 Update Health Maintenance if applicable    Flex Sigmoidoscopy Screen  Covered every 5 years No results found for this or any previous visit. No flowsheet data found.      Fecal Occult Blood   Co benefits, but Medicare does not cover unless Medically needed    Zoster (Not covered by Medicare Part B) No orders found for this or any previous visit.  This may be covered with your pharmacy  prescription benefits     Recommended Websites for Advanced Dir

## 2019-12-06 NOTE — PROGRESS NOTES
HPI:   Nayeli Gleason is a 68year old male who presents for a Medicare Subsequent Annual Wellness visit (Pt already had Initial Annual Wellness).         Pt comes as a ne wpt for his medicare annual wellness visit  referred by daughter and wife Javed Rogers on screening of functional status. Problems with daily activities? : Yes   He has problems with Memory based on screening of functional status.    Memory Problems?: Yes       Depression Screening (PHQ-2/PHQ-9): Over the LAST 2 WEEKS         [facility] 85 09/14/2019    TRIG 131 09/14/2019          Last Chemistry Labs:   Lab Results   Component Value Date    AST 35 09/14/2019    ALT 53 09/14/2019    CA 9.3 09/14/2019    ALB 4.1 09/14/2019    TSH 2.170 09/14/2019    CREATSERUM 1.07 09/14/2019     (H daily.  [DISCONTINUED] Pravastatin Sodium (PRAVACHOL) 40 MG Oral Tab, Take 1 tablet (40 mg total) by mouth nightly. ONETOUCH DELICA LANCETS FINE Does not apply Misc, 1 lancet by Finger stick route daily.   Multiple Vitamin (MULTI-VITAMIN DAILY) Oral Tab, T denies abdominal pain, denies heartburn  : 0 per night nocturia, no complaint of urinary incontinence  MUSCULOSKELETAL: + back pain--her back--goes away with Advil or Aleve  NEURO: +  headaches  PSYCHE: denies depression or anxiety  HEMATOLOGIC: denies h think I may have hearing loss:   No               Visual Acuity                           Physical Exam    GENERAL: well developed, well nourished,in no apparent distress  SKIN: no rashes,no suspicious lesions  HEENT: atraumatic, normocephalic,ears and thro long-term current use of insulin (HCC)  -     HEMOGLOBIN A1C; Future  -     losartan 100 MG Oral Tab; Take 1 tablet (100 mg total) by mouth daily.   -     metFORMIN HCl  MG Oral Tablet 24 Hr; TAKE 2 TABLETS 2 TIMES     DAILY WITH MEALS    Hba1c 7.2 on test  Labs–ordered to be done once fasting      Diet assessment: fair     PLAN:  The patient indicates understanding of these issues and agrees to the plan. Reinforced healthy diet, lifestyle, and exercise. \"my wife cooks \"     No follow-ups on file. Annually No vaccine history found Please get every year    Pneumococcal 13 (Prevnar)  Covered Once after 65 No vaccine history found Please get once after your 65th birthday    Pneumococcal 23 (Pneumovax)  Covered Once after 65 No vaccine history found Ple data found.

## 2020-03-13 ENCOUNTER — LAB ENCOUNTER (OUTPATIENT)
Dept: LAB | Facility: HOSPITAL | Age: 75
End: 2020-03-13
Attending: INTERNAL MEDICINE
Payer: MEDICARE

## 2020-03-13 DIAGNOSIS — E11.9 TYPE 2 DIABETES MELLITUS WITHOUT COMPLICATION, WITHOUT LONG-TERM CURRENT USE OF INSULIN (HCC): ICD-10-CM

## 2020-03-13 DIAGNOSIS — E03.9 HYPOTHYROIDISM, UNSPECIFIED TYPE: ICD-10-CM

## 2020-03-13 DIAGNOSIS — Z00.00 ENCOUNTER FOR ANNUAL HEALTH EXAMINATION: ICD-10-CM

## 2020-03-13 DIAGNOSIS — Z12.5 ENCOUNTER FOR SCREENING FOR MALIGNANT NEOPLASM OF PROSTATE: ICD-10-CM

## 2020-03-13 LAB
ALBUMIN SERPL-MCNC: 4.1 G/DL (ref 3.4–5)
ALBUMIN/GLOB SERPL: 1.2 {RATIO} (ref 1–2)
ALP LIVER SERPL-CCNC: 46 U/L (ref 45–117)
ALT SERPL-CCNC: 65 U/L (ref 16–61)
ANION GAP SERPL CALC-SCNC: 7 MMOL/L (ref 0–18)
AST SERPL-CCNC: 42 U/L (ref 15–37)
BASOPHILS # BLD AUTO: 0.06 X10(3) UL (ref 0–0.2)
BASOPHILS NFR BLD AUTO: 0.8 %
BILIRUB SERPL-MCNC: 0.3 MG/DL (ref 0.1–2)
BUN BLD-MCNC: 19 MG/DL (ref 7–18)
BUN/CREAT SERPL: 14.8 (ref 10–20)
CALCIUM BLD-MCNC: 9.9 MG/DL (ref 8.5–10.1)
CHLORIDE SERPL-SCNC: 106 MMOL/L (ref 98–112)
CHOLEST SMN-MCNC: 153 MG/DL (ref ?–200)
CO2 SERPL-SCNC: 27 MMOL/L (ref 21–32)
COMPLEXED PSA SERPL-MCNC: 0.48 NG/ML (ref ?–4)
CREAT BLD-MCNC: 1.28 MG/DL (ref 0.7–1.3)
DEPRECATED RDW RBC AUTO: 44.8 FL (ref 35.1–46.3)
EOSINOPHIL # BLD AUTO: 0.16 X10(3) UL (ref 0–0.7)
EOSINOPHIL NFR BLD AUTO: 2 %
ERYTHROCYTE [DISTWIDTH] IN BLOOD BY AUTOMATED COUNT: 13.2 % (ref 11–15)
EST. AVERAGE GLUCOSE BLD GHB EST-MCNC: 203 MG/DL (ref 68–126)
GLOBULIN PLAS-MCNC: 3.3 G/DL (ref 2.8–4.4)
GLUCOSE BLD-MCNC: 144 MG/DL (ref 70–99)
HBA1C MFR BLD HPLC: 8.7 % (ref ?–5.7)
HCT VFR BLD AUTO: 44.9 % (ref 39–53)
HDLC SERPL-MCNC: 38 MG/DL (ref 40–59)
HGB BLD-MCNC: 14.8 G/DL (ref 13–17.5)
IMM GRANULOCYTES # BLD AUTO: 0.05 X10(3) UL (ref 0–1)
IMM GRANULOCYTES NFR BLD: 0.6 %
LDLC SERPL CALC-MCNC: 79 MG/DL (ref ?–100)
LYMPHOCYTES # BLD AUTO: 3.43 X10(3) UL (ref 1–4)
LYMPHOCYTES NFR BLD AUTO: 43.7 %
M PROTEIN MFR SERPL ELPH: 7.4 G/DL (ref 6.4–8.2)
MCH RBC QN AUTO: 30.2 PG (ref 26–34)
MCHC RBC AUTO-ENTMCNC: 33 G/DL (ref 31–37)
MCV RBC AUTO: 91.6 FL (ref 80–100)
MONOCYTES # BLD AUTO: 0.72 X10(3) UL (ref 0.1–1)
MONOCYTES NFR BLD AUTO: 9.2 %
NEUTROPHILS # BLD AUTO: 3.43 X10 (3) UL (ref 1.5–7.7)
NEUTROPHILS # BLD AUTO: 3.43 X10(3) UL (ref 1.5–7.7)
NEUTROPHILS NFR BLD AUTO: 43.7 %
NONHDLC SERPL-MCNC: 115 MG/DL (ref ?–130)
OSMOLALITY SERPL CALC.SUM OF ELEC: 295 MOSM/KG (ref 275–295)
PATIENT FASTING Y/N/NP: YES
PATIENT FASTING Y/N/NP: YES
PLATELET # BLD AUTO: 266 10(3)UL (ref 150–450)
POTASSIUM SERPL-SCNC: 4.7 MMOL/L (ref 3.5–5.1)
RBC # BLD AUTO: 4.9 X10(6)UL (ref 3.8–5.8)
SODIUM SERPL-SCNC: 140 MMOL/L (ref 136–145)
TRIGL SERPL-MCNC: 181 MG/DL (ref 30–149)
TSI SER-ACNC: 3.23 MIU/ML (ref 0.36–3.74)
VLDLC SERPL CALC-MCNC: 36 MG/DL (ref 0–30)
WBC # BLD AUTO: 7.9 X10(3) UL (ref 4–11)

## 2020-03-13 PROCEDURE — 36415 COLL VENOUS BLD VENIPUNCTURE: CPT

## 2020-03-13 PROCEDURE — 85025 COMPLETE CBC W/AUTO DIFF WBC: CPT

## 2020-03-13 PROCEDURE — 80053 COMPREHEN METABOLIC PANEL: CPT

## 2020-03-13 PROCEDURE — 80061 LIPID PANEL: CPT

## 2020-03-13 PROCEDURE — 83036 HEMOGLOBIN GLYCOSYLATED A1C: CPT

## 2020-03-13 PROCEDURE — 84443 ASSAY THYROID STIM HORMONE: CPT

## 2020-03-19 ENCOUNTER — TELEPHONE (OUTPATIENT)
Dept: INTERNAL MEDICINE CLINIC | Facility: CLINIC | Age: 75
End: 2020-03-19

## 2020-03-19 ENCOUNTER — OFFICE VISIT (OUTPATIENT)
Dept: ENDOCRINOLOGY CLINIC | Facility: CLINIC | Age: 75
End: 2020-03-19
Payer: MEDICARE

## 2020-03-19 VITALS
DIASTOLIC BLOOD PRESSURE: 76 MMHG | SYSTOLIC BLOOD PRESSURE: 128 MMHG | WEIGHT: 309 LBS | HEART RATE: 80 BPM | BODY MASS INDEX: 43 KG/M2

## 2020-03-19 DIAGNOSIS — E11.65 UNCONTROLLED TYPE 2 DIABETES MELLITUS WITH HYPERGLYCEMIA (HCC): Primary | ICD-10-CM

## 2020-03-19 LAB
GLUCOSE BLOOD: 170
TEST STRIP LOT #: NORMAL NUMERIC

## 2020-03-19 PROCEDURE — 82962 GLUCOSE BLOOD TEST: CPT | Performed by: INTERNAL MEDICINE

## 2020-03-19 PROCEDURE — 36416 COLLJ CAPILLARY BLOOD SPEC: CPT | Performed by: INTERNAL MEDICINE

## 2020-03-19 PROCEDURE — 99213 OFFICE O/P EST LOW 20 MIN: CPT | Performed by: INTERNAL MEDICINE

## 2020-03-19 NOTE — PROGRESS NOTES
Name: Sarah Carson  Date: 3/19/2020    Referring Physician: No ref. provider found    HISTORY OF PRESENT ILLNESS   Sarah Carson is a 69year old male who presents for diabetes mellitus, diagnosed at age 49.       Prior HbA, C or glycohemoglobin we Rfl: 3  •  amLODIPine Besylate 10 MG Oral Tab, Take 1 tablet (10 mg total) by mouth daily. , Disp: 90 tablet, Rfl: 3  •  losartan 100 MG Oral Tab, Take 1 tablet (100 mg total) by mouth daily. , Disp: 90 tablet, Rfl: 1  •  metFORMIN HCl  MG Oral Tablet • ASD (atrial septal defect)     WILD 9-16   • Chronic kidney disease, stage II (mild)    • Diabetes (HCC)    • Dyslipidemia    • Essential hypertension    • Hearing loss    • History of CVA (cerebrovascular accident)     Multiple, asymptomatic   • Hyperl diabetes  -Discussed complications of diabetes include retinopathy, neuropathy, nephropathy and cardiovascular disease  -Discussed importance of SBGM  -Discussed importance of low CHO diet, recommend 45gm per meal or 135gm per day  -Continue Trulicity   -C

## 2020-03-23 ENCOUNTER — OFFICE VISIT (OUTPATIENT)
Dept: NEPHROLOGY | Facility: CLINIC | Age: 75
End: 2020-03-23
Payer: MEDICARE

## 2020-03-23 ENCOUNTER — TELEPHONE (OUTPATIENT)
Dept: ENDOCRINOLOGY CLINIC | Facility: CLINIC | Age: 75
End: 2020-03-23

## 2020-03-23 VITALS
HEART RATE: 71 BPM | TEMPERATURE: 99 F | DIASTOLIC BLOOD PRESSURE: 78 MMHG | SYSTOLIC BLOOD PRESSURE: 130 MMHG | BODY MASS INDEX: 43.26 KG/M2 | WEIGHT: 309 LBS | HEIGHT: 71 IN

## 2020-03-23 DIAGNOSIS — I10 ESSENTIAL HYPERTENSION: ICD-10-CM

## 2020-03-23 DIAGNOSIS — N18.30 CKD (CHRONIC KIDNEY DISEASE), STAGE III (HCC): ICD-10-CM

## 2020-03-23 DIAGNOSIS — N18.2 CKD (CHRONIC KIDNEY DISEASE), STAGE II: Primary | ICD-10-CM

## 2020-03-23 PROCEDURE — 99214 OFFICE O/P EST MOD 30 MIN: CPT | Performed by: INTERNAL MEDICINE

## 2020-03-23 PROCEDURE — G0463 HOSPITAL OUTPT CLINIC VISIT: HCPCS | Performed by: INTERNAL MEDICINE

## 2020-03-23 RX ORDER — MELOXICAM 15 MG/1
TABLET ORAL
COMMUNITY
Start: 2020-02-19 | End: 2020-03-23 | Stop reason: ALTCHOICE

## 2020-03-23 RX ORDER — CARVEDILOL 3.12 MG/1
3.12 TABLET ORAL 2 TIMES DAILY WITH MEALS
Qty: 180 TABLET | Refills: 0 | Status: SHIPPED | OUTPATIENT
Start: 2020-03-23 | End: 2020-06-12

## 2020-03-23 NOTE — TELEPHONE ENCOUNTER
I started the low dose of Invokana which is safe for kidney and will actually help preserve function. I would still like him to start the medication. Thanks.

## 2020-03-23 NOTE — TELEPHONE ENCOUNTER
Patient has questions regarding Invokana. Also requesting to discuss treatment. Please call. Thank you.

## 2020-03-23 NOTE — TELEPHONE ENCOUNTER
Pt asking if he should still start Invokana or not? Pt confirmed he has not started it and forgot to mention that he had been on steriods before last apt and forgot to let provider know.  Sat  after coffee and bkfst. RN advised pt to check before eati

## 2020-03-23 NOTE — PROGRESS NOTES
Progress Note     Reyes Bobo is a 69 yrs old male with pmh of DM x 20 yrs, HTN X 20 yrs, CKD stage II, sleep apnea, morbidly obese on CPAP, right hip replacement, CVA who presented today for follow up     Patient baseline creatinine is around 1.0- On nightly CPAP   • Osteoarthritis     Status post right KANCHAN January 2013   • Sleep apnea    • Type 2 diabetes mellitus ([facility] [address]       Past Surgical History:   Procedure Laterality Date   • HERNIA SURGERY Right    • HIP REPLACEMENT SURGERY Right    • INGUI taking: Reported on 3/23/2020 ) 90 tablet 1   • vitamin E 1000 UNITS Oral Cap Take 1,000 Units by mouth every morning.          Allergies:    Pcn [Penicillins]       UNKNOWN      ROS:     Constitutional:  Negative for decreased activity, fever, irritability to baseline of 1.1 mg/dl with an eGFR > 62ml/min -> 1.28 mg/dl with an eGFR 55 ml/min - repeat   - UA unremarkable.  UACR 50 mg -  on losartan 100mg daily   - serum albumin and calcium wnl  - goal Aic <7% - close to goal. Aic 7.3%   - avoid nephrotoxins - o

## 2020-03-23 NOTE — PATIENT INSTRUCTIONS
Follow up in 6 months   Lab test in 4-6 weeks   Stop amlodipine   Start on coreg twice a day   Call in 2-3 weeks with home BP and leg swelling

## 2020-05-22 ENCOUNTER — APPOINTMENT (OUTPATIENT)
Dept: LAB | Facility: HOSPITAL | Age: 75
End: 2020-05-22
Attending: INTERNAL MEDICINE
Payer: MEDICARE

## 2020-05-22 DIAGNOSIS — N18.30 CKD (CHRONIC KIDNEY DISEASE), STAGE III (HCC): ICD-10-CM

## 2020-05-22 PROCEDURE — 80048 BASIC METABOLIC PNL TOTAL CA: CPT

## 2020-05-22 PROCEDURE — 82570 ASSAY OF URINE CREATININE: CPT

## 2020-05-22 PROCEDURE — 84156 ASSAY OF PROTEIN URINE: CPT

## 2020-05-22 PROCEDURE — 82043 UR ALBUMIN QUANTITATIVE: CPT

## 2020-05-22 PROCEDURE — 36415 COLL VENOUS BLD VENIPUNCTURE: CPT

## 2020-06-12 RX ORDER — CARVEDILOL 3.12 MG/1
TABLET ORAL
Qty: 180 TABLET | Refills: 0 | Status: SHIPPED | OUTPATIENT
Start: 2020-06-12 | End: 2020-09-09

## 2020-07-14 ENCOUNTER — TELEPHONE (OUTPATIENT)
Dept: ENDOCRINOLOGY CLINIC | Facility: CLINIC | Age: 75
End: 2020-07-14

## 2020-07-15 NOTE — TELEPHONE ENCOUNTER
Please advise pt was told to contact provider for alternate.   Needs it sent to [facility] mail order 90 day supply

## 2020-07-16 RX ORDER — METFORMIN HYDROCHLORIDE 750 MG/1
750 TABLET, EXTENDED RELEASE ORAL 2 TIMES DAILY WITH MEALS
Qty: 180 TABLET | Refills: 0 | Status: SHIPPED | OUTPATIENT
Start: 2020-07-16 | End: 2020-09-09

## 2020-07-27 ENCOUNTER — TELEPHONE (OUTPATIENT)
Dept: ENDOCRINOLOGY CLINIC | Facility: CLINIC | Age: 75
End: 2020-07-27

## 2020-07-27 NOTE — TELEPHONE ENCOUNTER
Current Outpatient Medications   Medication Sig Dispense Refill   •       •       •       •       •       •       •       •       • TRULICITY 1.5 ZX/3.7ZL Subcutaneous Solution Pen-injector INJECT 0.5ML (=1.5 MG)     SUBCUTANEOUSLY EVERY 7 DAYS 6 mL 3   •

## 2020-07-28 ENCOUNTER — TELEPHONE (OUTPATIENT)
Dept: INTERNAL MEDICINE CLINIC | Facility: CLINIC | Age: 75
End: 2020-07-28

## 2020-07-28 DIAGNOSIS — E11.9 TYPE 2 DIABETES MELLITUS WITHOUT COMPLICATION, WITHOUT LONG-TERM CURRENT USE OF INSULIN (HCC): Primary | ICD-10-CM

## 2020-07-28 NOTE — TELEPHONE ENCOUNTER
Patient's wife is requesting an order for bloodwork before his next appointment.  States the doctor would know which blood work he needs to get done

## 2020-07-28 NOTE — TELEPHONE ENCOUNTER
Request received for blood work. Patient's last blood work done 3/13/20. Orders pended for provider to review and sign off, based on last blood work order.

## 2020-09-08 ENCOUNTER — LAB ENCOUNTER (OUTPATIENT)
Dept: LAB | Facility: HOSPITAL | Age: 75
End: 2020-09-08
Attending: INTERNAL MEDICINE
Payer: MEDICARE

## 2020-09-08 DIAGNOSIS — E11.9 DM TYPE 2 (DIABETES MELLITUS, TYPE 2) (HCC): Primary | ICD-10-CM

## 2020-09-08 DIAGNOSIS — E11.9 TYPE 2 DIABETES MELLITUS WITHOUT COMPLICATION, WITHOUT LONG-TERM CURRENT USE OF INSULIN (HCC): ICD-10-CM

## 2020-09-08 LAB
ALBUMIN SERPL-MCNC: 4.1 G/DL (ref 3.4–5)
ALBUMIN/GLOB SERPL: 1.3 {RATIO} (ref 1–2)
ALP LIVER SERPL-CCNC: 35 U/L (ref 45–117)
ALT SERPL-CCNC: 54 U/L (ref 16–61)
ANION GAP SERPL CALC-SCNC: 7 MMOL/L (ref 0–18)
AST SERPL-CCNC: 36 U/L (ref 15–37)
BILIRUB SERPL-MCNC: 0.4 MG/DL (ref 0.1–2)
BUN BLD-MCNC: 25 MG/DL (ref 7–18)
BUN/CREAT SERPL: 19.8 (ref 10–20)
CALCIUM BLD-MCNC: 9.6 MG/DL (ref 8.5–10.1)
CHLORIDE SERPL-SCNC: 107 MMOL/L (ref 98–112)
CO2 SERPL-SCNC: 26 MMOL/L (ref 21–32)
CREAT BLD-MCNC: 1.26 MG/DL (ref 0.7–1.3)
EST. AVERAGE GLUCOSE BLD GHB EST-MCNC: 151 MG/DL (ref 68–126)
GLOBULIN PLAS-MCNC: 3.2 G/DL (ref 2.8–4.4)
GLUCOSE BLD-MCNC: 87 MG/DL (ref 70–99)
HBA1C MFR BLD HPLC: 6.9 % (ref ?–5.7)
M PROTEIN MFR SERPL ELPH: 7.3 G/DL (ref 6.4–8.2)
OSMOLALITY SERPL CALC.SUM OF ELEC: 294 MOSM/KG (ref 275–295)
PATIENT FASTING Y/N/NP: YES
POTASSIUM SERPL-SCNC: 4.4 MMOL/L (ref 3.5–5.1)
SODIUM SERPL-SCNC: 140 MMOL/L (ref 136–145)

## 2020-09-08 PROCEDURE — 83036 HEMOGLOBIN GLYCOSYLATED A1C: CPT

## 2020-09-08 PROCEDURE — 36415 COLL VENOUS BLD VENIPUNCTURE: CPT

## 2020-09-08 PROCEDURE — 80053 COMPREHEN METABOLIC PANEL: CPT

## 2020-09-09 ENCOUNTER — OFFICE VISIT (OUTPATIENT)
Dept: INTERNAL MEDICINE CLINIC | Facility: CLINIC | Age: 75
End: 2020-09-09
Payer: MEDICARE

## 2020-09-09 VITALS
SYSTOLIC BLOOD PRESSURE: 116 MMHG | DIASTOLIC BLOOD PRESSURE: 74 MMHG | HEIGHT: 71 IN | BODY MASS INDEX: 40.71 KG/M2 | WEIGHT: 290.81 LBS | HEART RATE: 65 BPM

## 2020-09-09 DIAGNOSIS — E79.0 HYPERURICEMIA: ICD-10-CM

## 2020-09-09 DIAGNOSIS — I10 ESSENTIAL HYPERTENSION: Primary | ICD-10-CM

## 2020-09-09 DIAGNOSIS — E03.9 HYPOTHYROIDISM, UNSPECIFIED TYPE: ICD-10-CM

## 2020-09-09 DIAGNOSIS — E78.5 HYPERLIPIDEMIA LDL GOAL <100: ICD-10-CM

## 2020-09-09 DIAGNOSIS — E66.01 MORBID OBESITY (HCC): ICD-10-CM

## 2020-09-09 DIAGNOSIS — E11.9 TYPE 2 DIABETES MELLITUS WITHOUT COMPLICATION, WITHOUT LONG-TERM CURRENT USE OF INSULIN (HCC): ICD-10-CM

## 2020-09-09 DIAGNOSIS — I70.0 AORTIC ATHEROSCLEROSIS (HCC): ICD-10-CM

## 2020-09-09 PROCEDURE — G0463 HOSPITAL OUTPT CLINIC VISIT: HCPCS | Performed by: INTERNAL MEDICINE

## 2020-09-09 PROCEDURE — 99214 OFFICE O/P EST MOD 30 MIN: CPT | Performed by: INTERNAL MEDICINE

## 2020-09-09 RX ORDER — CARVEDILOL 3.12 MG/1
3.12 TABLET ORAL 2 TIMES DAILY WITH MEALS
Qty: 180 TABLET | Refills: 3 | Status: SHIPPED | OUTPATIENT
Start: 2020-09-09 | End: 2021-07-25

## 2020-09-09 RX ORDER — METFORMIN HYDROCHLORIDE 750 MG/1
750 TABLET, EXTENDED RELEASE ORAL 2 TIMES DAILY WITH MEALS
Qty: 180 TABLET | Refills: 3 | Status: SHIPPED | OUTPATIENT
Start: 2020-09-09 | End: 2020-09-10

## 2020-09-09 RX ORDER — PRAVASTATIN SODIUM 40 MG
40 TABLET ORAL NIGHTLY
Qty: 90 TABLET | Refills: 3 | Status: SHIPPED | OUTPATIENT
Start: 2020-09-09 | End: 2021-07-25

## 2020-09-09 RX ORDER — LOSARTAN POTASSIUM 100 MG/1
100 TABLET ORAL DAILY
Qty: 90 TABLET | Refills: 3 | Status: SHIPPED | OUTPATIENT
Start: 2020-09-09 | End: 2021-07-25

## 2020-09-09 RX ORDER — GLIPIZIDE 10 MG/1
10 TABLET, FILM COATED, EXTENDED RELEASE ORAL 2 TIMES DAILY
Qty: 180 TABLET | Refills: 3 | Status: SHIPPED | OUTPATIENT
Start: 2020-09-09 | End: 2021-07-25

## 2020-09-09 RX ORDER — ALLOPURINOL 100 MG/1
100 TABLET ORAL DAILY
Qty: 90 TABLET | Refills: 3 | Status: SHIPPED | OUTPATIENT
Start: 2020-09-09 | End: 2021-07-25

## 2020-09-09 RX ORDER — FENOFIBRATE 160 MG/1
160 TABLET ORAL
Qty: 90 TABLET | Refills: 3 | Status: SHIPPED | OUTPATIENT
Start: 2020-09-09 | End: 2021-07-25

## 2020-09-09 RX ORDER — LEVOTHYROXINE SODIUM 0.05 MG/1
50 TABLET ORAL
Qty: 90 TABLET | Refills: 3 | Status: SHIPPED | OUTPATIENT
Start: 2020-09-09 | End: 2021-07-25

## 2020-09-09 NOTE — PROGRESS NOTES
Delores Jasso is a 69year old male.   Patient presents with:  Test Results: 9/8 labs  HTN      HPI:   Patient comes for follow-up  C/C hypertension, dm   C/o bl sugars 90s - 120s   Saw kid dr in march and has upcoming apt with endo and nephro this week route daily. 90 each PRN   • Multiple Vitamin (MULTI-VITAMIN DAILY) Oral Tab Take 1 tablet by mouth daily. • aspirin 325 MG Oral Tab Take 325 mg by mouth daily. • vitamin E 1000 UNITS Oral Cap Take 1,000 Units by mouth every morning.      • Vitamin feet  NEURO: denies headaches , anxiety, depression    EXAM:   /74   Pulse 65   Ht 5' 11\" (1.803 m)   Wt 290 lb 12.8 oz (131.9 kg)   BMI 40.56 kg/m²   GENERAL: well developed, well nourished,in no apparent distress  HEENT: atraumatic, normocephalic Pen-injector; Inject 1.5 mg into the skin every 7 days. -     CBC WITH DIFFERENTIAL WITH PLATELET; Future  -     LIPID PANEL; Future    Hba1c 7.2 on 9/19 and 6.9 on 9/2020   U.  Micro + 9/19 and ok on 5/2020   Eye exam -will refer - dr Corbin Zavaleta , arb

## 2020-09-10 ENCOUNTER — OFFICE VISIT (OUTPATIENT)
Dept: ENDOCRINOLOGY CLINIC | Facility: CLINIC | Age: 75
End: 2020-09-10
Payer: MEDICARE

## 2020-09-10 VITALS
HEART RATE: 63 BPM | WEIGHT: 290 LBS | DIASTOLIC BLOOD PRESSURE: 77 MMHG | BODY MASS INDEX: 40 KG/M2 | SYSTOLIC BLOOD PRESSURE: 128 MMHG

## 2020-09-10 DIAGNOSIS — E11.65 UNCONTROLLED TYPE 2 DIABETES MELLITUS WITH HYPERGLYCEMIA (HCC): Primary | ICD-10-CM

## 2020-09-10 LAB
GLUCOSE BLOOD: 155
TEST STRIP LOT #: NORMAL NUMERIC

## 2020-09-10 PROCEDURE — 36416 COLLJ CAPILLARY BLOOD SPEC: CPT | Performed by: INTERNAL MEDICINE

## 2020-09-10 PROCEDURE — 99214 OFFICE O/P EST MOD 30 MIN: CPT | Performed by: INTERNAL MEDICINE

## 2020-09-10 PROCEDURE — 82962 GLUCOSE BLOOD TEST: CPT | Performed by: INTERNAL MEDICINE

## 2020-09-10 NOTE — PROGRESS NOTES
Name: Stan Gonzalez  Date: 9/10/2020    Referring Physician: No ref. provider found    HISTORY OF PRESENT ILLNESS   Stan Gonzalez is a 69year old male who presents for diabetes mellitus, diagnosed at age 49.       Prior HbA, C or glycohemoglobin we tablet (10 mg total) by mouth 2 (two) times daily. , Disp: 180 tablet, Rfl: 3  •  Fenofibrate 160 MG Oral Tab, Take 1 tablet (160 mg total) by mouth once daily. , Disp: 90 tablet, Rfl: 3  •  carvedilol 3.125 MG Oral Tab, Take 1 tablet (3.125 mg total) by igor ill-defined, cerebrovascular disease    • Aortic atherosclerosis (HCC)     CT scan 4-14   • ASD (atrial septal defect)     WILD 9-16   • Chronic kidney disease, stage II (mild)    • Diabetes (HCC)    • Dyslipidemia    • Essential hypertension    • Hearing l retinopathy, neuropathy, nephropathy and cardiovascular disease  -Discussed importance of SBGM  -Discussed importance of low CHO diet, recommend 45gm per meal or 135gm per day  -Continue Trulicity   -Continue Glipizide 10mg PO BID   -Change metformin 500mg

## 2020-09-11 ENCOUNTER — OFFICE VISIT (OUTPATIENT)
Dept: NEPHROLOGY | Facility: CLINIC | Age: 75
End: 2020-09-11
Payer: MEDICARE

## 2020-09-11 VITALS
HEART RATE: 67 BPM | HEIGHT: 71 IN | DIASTOLIC BLOOD PRESSURE: 81 MMHG | TEMPERATURE: 97 F | WEIGHT: 291.63 LBS | SYSTOLIC BLOOD PRESSURE: 143 MMHG | BODY MASS INDEX: 40.83 KG/M2

## 2020-09-11 DIAGNOSIS — I10 ESSENTIAL HYPERTENSION: ICD-10-CM

## 2020-09-11 DIAGNOSIS — N18.30 CKD (CHRONIC KIDNEY DISEASE), STAGE III (HCC): Primary | ICD-10-CM

## 2020-09-11 PROCEDURE — G0463 HOSPITAL OUTPT CLINIC VISIT: HCPCS | Performed by: INTERNAL MEDICINE

## 2020-09-11 PROCEDURE — 99213 OFFICE O/P EST LOW 20 MIN: CPT | Performed by: INTERNAL MEDICINE

## 2020-09-11 NOTE — PROGRESS NOTES
Progress Note     Mary Beth Reyes is a 69 yrs old male with pmh of DM x 20 yrs, HTN X 20 yrs, CKD stage II, sleep apnea, morbidly obese on CPAP, right hip replacement, CVA who presented today for follow up     Patient baseline creatinine is around 1.0- SURGERY Right    • INGUINAL HERNIA REPAIR Right 1979   • LEG/ANKLE SURGERY PROC UNLISTED Left 1980's    Bone chips removed from ankle   • TOTAL HIP REPLACEMENT Right 01/2013   • VASECTOMY  1982           Medications (Active prior to today's visit):  [place] ear drainage, hearing loss and nasal drainage  Eyes:  Negative for eye discharge and vision loss  Cardiovascular:  Negative for chest pain, sob  Respiratory:  Negative for cough, dyspnea and wheezing  Gastrointestinal:  Negative for abdominal pain, constip - LSM counseled for weight loss     2.  HTN:    - on carvedilol 3.125 mg BID and losartan 100mg daily   - home BP in 130s range  - has lost 20 lbs  - improve leg swelling post discontinuation of amlodipine     Follow up in 6 months       Orders This Visit

## 2020-09-13 DIAGNOSIS — E11.65 UNCONTROLLED TYPE 2 DIABETES MELLITUS WITH HYPERGLYCEMIA (HCC): Primary | ICD-10-CM

## 2020-09-14 RX ORDER — CANAGLIFLOZIN 100 MG/1
TABLET, FILM COATED ORAL
Qty: 90 TABLET | Refills: 1 | Status: SHIPPED | OUTPATIENT
Start: 2020-09-14 | End: 2021-02-12

## 2020-11-17 ENCOUNTER — OFFICE VISIT (OUTPATIENT)
Dept: PULMONOLOGY | Facility: CLINIC | Age: 75
End: 2020-11-17
Payer: MEDICARE

## 2020-11-17 VITALS
HEIGHT: 71 IN | OXYGEN SATURATION: 96 % | TEMPERATURE: 99 F | HEART RATE: 67 BPM | DIASTOLIC BLOOD PRESSURE: 78 MMHG | RESPIRATION RATE: 18 BRPM | WEIGHT: 294 LBS | BODY MASS INDEX: 41.16 KG/M2 | SYSTOLIC BLOOD PRESSURE: 143 MMHG

## 2020-11-17 DIAGNOSIS — G47.33 OBSTRUCTIVE SLEEP APNEA: Primary | ICD-10-CM

## 2020-11-17 DIAGNOSIS — I25.10 CORONARY ARTERY DISEASE DUE TO LIPID RICH PLAQUE: ICD-10-CM

## 2020-11-17 DIAGNOSIS — I25.83 CORONARY ARTERY DISEASE DUE TO LIPID RICH PLAQUE: ICD-10-CM

## 2020-11-17 PROCEDURE — G0463 HOSPITAL OUTPT CLINIC VISIT: HCPCS | Performed by: INTERNAL MEDICINE

## 2020-11-17 PROCEDURE — 99213 OFFICE O/P EST LOW 20 MIN: CPT | Performed by: INTERNAL MEDICINE

## 2020-11-17 NOTE — PROGRESS NOTES
The patient is 71-year-old male who I know well from prior evaluation who comes in now for follow-up. He notes that his sleep is really good with the nasal cushion mask.   His download is excellent with average daily usage 9 hours and 23 minutes and residu

## 2020-11-24 ENCOUNTER — HOSPITAL ENCOUNTER (OUTPATIENT)
Dept: NUCLEAR MEDICINE | Facility: HOSPITAL | Age: 75
Discharge: HOME | End: 2020-11-24
Attending: INTERNAL MEDICINE
Payer: MEDICARE

## 2020-11-24 ENCOUNTER — PATIENT MESSAGE (OUTPATIENT)
Dept: PULMONOLOGY | Facility: CLINIC | Age: 75
End: 2020-11-24

## 2020-11-24 ENCOUNTER — HOSPITAL ENCOUNTER (OUTPATIENT)
Dept: CV DIAGNOSTICS | Facility: HOSPITAL | Age: 75
Discharge: HOME | End: 2020-11-24
Attending: INTERNAL MEDICINE
Payer: MEDICARE

## 2020-11-24 DIAGNOSIS — I25.83 CORONARY ARTERY DISEASE DUE TO LIPID RICH PLAQUE: ICD-10-CM

## 2020-11-24 DIAGNOSIS — I25.10 CORONARY ARTERY DISEASE DUE TO LIPID RICH PLAQUE: ICD-10-CM

## 2020-11-24 PROCEDURE — 93018 CV STRESS TEST I&R ONLY: CPT | Performed by: INTERNAL MEDICINE

## 2020-11-24 PROCEDURE — 93016 CV STRESS TEST SUPVJ ONLY: CPT | Performed by: INTERNAL MEDICINE

## 2020-11-24 PROCEDURE — 93017 CV STRESS TEST TRACING ONLY: CPT | Performed by: INTERNAL MEDICINE

## 2020-11-24 PROCEDURE — 78452 HT MUSCLE IMAGE SPECT MULT: CPT | Performed by: INTERNAL MEDICINE

## 2020-11-25 NOTE — TELEPHONE ENCOUNTER
From: Tim Mullins  To: Hoa Marcos MD  Sent: 11/24/2020 3:18 PM CST  Subject: Test Results Question    Can you please send a message to my cardiologist Sonal Kc to look over my recent tests that Dr. Angy Ariza ordered?   Dr Sonal Kc is with Adv

## 2020-11-25 NOTE — TELEPHONE ENCOUNTER
Dr. Sheryl Miles please review Izabela Smack results from yesterday. Once reviewed we can fax the results to his cardiologist's office for review as well. Thank you. Nuclear stress test performed yesterday. Yet to be reviewed by Dr. Sheryl Miles.      Per LOV note with

## 2020-11-27 NOTE — TELEPHONE ENCOUNTER
Dr. Sheryl Miles reviewed Sarath Bala results. Please see result note 11/26. I sent Oppa message to patient. Please fax results to Dr. Meghan Montano office. Thanks!

## 2020-12-02 NOTE — TELEPHONE ENCOUNTER
[phone] per [facility] [id number] request below.

## 2021-01-29 DIAGNOSIS — Z23 NEED FOR VACCINATION: ICD-10-CM

## 2021-02-05 ENCOUNTER — IMMUNIZATION (OUTPATIENT)
Dept: LAB | Facility: HOSPITAL | Age: 76
End: 2021-02-05
Attending: HOSPITALIST
Payer: MEDICARE

## 2021-02-05 DIAGNOSIS — Z23 NEED FOR VACCINATION: Primary | ICD-10-CM

## 2021-02-05 PROCEDURE — 0011A SARSCOV2 VAC 100MCG/0.5ML IM: CPT

## 2021-02-11 DIAGNOSIS — E11.65 UNCONTROLLED TYPE 2 DIABETES MELLITUS WITH HYPERGLYCEMIA (HCC): ICD-10-CM

## 2021-02-12 RX ORDER — CANAGLIFLOZIN 100 MG/1
TABLET, FILM COATED ORAL
Qty: 90 TABLET | Refills: 1 | Status: SHIPPED | OUTPATIENT
Start: 2021-02-12 | End: 2021-07-26

## 2021-02-12 NOTE — TELEPHONE ENCOUNTER
Rx request for Metformin  mg and INvokana 100 mg. Please review and sign off if approparite. Thank you.     Last office visit: 9/10/20  RTC: 6 months  Last refill: 9/10/2020

## 2021-02-22 DIAGNOSIS — E11.9 TYPE 2 DIABETES MELLITUS WITHOUT COMPLICATION, WITHOUT LONG-TERM CURRENT USE OF INSULIN (HCC): ICD-10-CM

## 2021-02-23 ENCOUNTER — PATIENT MESSAGE (OUTPATIENT)
Dept: INTERNAL MEDICINE CLINIC | Facility: CLINIC | Age: 76
End: 2021-02-23

## 2021-02-23 RX ORDER — BLOOD SUGAR DIAGNOSTIC
STRIP MISCELLANEOUS
Qty: 100 STRIP | Refills: 0 | Status: SHIPPED | OUTPATIENT
Start: 2021-02-23

## 2021-02-23 NOTE — TELEPHONE ENCOUNTER
From: Devon Mullins  To: Lyla Tena MD  Sent: 2/23/2021 12:08 PM CST  Subject: Prescription Question    Dr Gui Thorne reordered the lancets for my one touch kit yesterday but I also needed the one touch verio test strips ordered from [facility] mail order.  If

## 2021-03-05 ENCOUNTER — IMMUNIZATION (OUTPATIENT)
Dept: LAB | Facility: HOSPITAL | Age: 76
End: 2021-03-05
Attending: EMERGENCY MEDICINE
Payer: MEDICARE

## 2021-03-05 DIAGNOSIS — Z23 NEED FOR VACCINATION: Primary | ICD-10-CM

## 2021-03-05 PROCEDURE — 0012A SARSCOV2 VAC 100MCG/0.5ML IM: CPT

## 2021-03-10 ENCOUNTER — PATIENT MESSAGE (OUTPATIENT)
Dept: ENDOCRINOLOGY CLINIC | Facility: CLINIC | Age: 76
End: 2021-03-10

## 2021-03-10 DIAGNOSIS — E11.65 UNCONTROLLED TYPE 2 DIABETES MELLITUS WITH HYPERGLYCEMIA (HCC): Primary | ICD-10-CM

## 2021-03-10 NOTE — TELEPHONE ENCOUNTER
From: Luciano Ramírez  To: Holli Nuno MD  Sent: 3/10/2021 8:42 AM CST  Subject: Other    I asked Dr Vibha Lucero to sent in a request for blood work for my upcoming appointments.  If Dr Singh Hernandez could submit any other blood work tests needed for my upcoming dinora

## 2021-03-19 ENCOUNTER — LAB ENCOUNTER (OUTPATIENT)
Dept: LAB | Facility: HOSPITAL | Age: 76
End: 2021-03-19
Attending: INTERNAL MEDICINE
Payer: MEDICARE

## 2021-03-19 DIAGNOSIS — E11.9 TYPE 2 DIABETES MELLITUS WITHOUT COMPLICATION, WITHOUT LONG-TERM CURRENT USE OF INSULIN (HCC): ICD-10-CM

## 2021-03-19 DIAGNOSIS — E11.65 UNCONTROLLED TYPE 2 DIABETES MELLITUS WITH HYPERGLYCEMIA (HCC): ICD-10-CM

## 2021-03-19 LAB
ALBUMIN SERPL-MCNC: 4.2 G/DL (ref 3.4–5)
ALBUMIN/GLOB SERPL: 1.4 {RATIO} (ref 1–2)
ALP LIVER SERPL-CCNC: 47 U/L
ALT SERPL-CCNC: 38 U/L
ANION GAP SERPL CALC-SCNC: 7 MMOL/L (ref 0–18)
AST SERPL-CCNC: 20 U/L (ref 15–37)
BASOPHILS # BLD AUTO: 0.04 X10(3) UL (ref 0–0.2)
BASOPHILS NFR BLD AUTO: 0.5 %
BILIRUB SERPL-MCNC: 0.5 MG/DL (ref 0.1–2)
BUN BLD-MCNC: 29 MG/DL (ref 7–18)
BUN/CREAT SERPL: 23 (ref 10–20)
CALCIUM BLD-MCNC: 9.4 MG/DL (ref 8.5–10.1)
CHLORIDE SERPL-SCNC: 108 MMOL/L (ref 98–112)
CHOLEST SMN-MCNC: 159 MG/DL (ref ?–200)
CO2 SERPL-SCNC: 26 MMOL/L (ref 21–32)
CREAT BLD-MCNC: 1.26 MG/DL
CREAT UR-SCNC: 115 MG/DL
DEPRECATED RDW RBC AUTO: 45.3 FL (ref 35.1–46.3)
EOSINOPHIL # BLD AUTO: 0.14 X10(3) UL (ref 0–0.7)
EOSINOPHIL NFR BLD AUTO: 1.6 %
ERYTHROCYTE [DISTWIDTH] IN BLOOD BY AUTOMATED COUNT: 13.4 % (ref 11–15)
EST. AVERAGE GLUCOSE BLD GHB EST-MCNC: 163 MG/DL (ref 68–126)
GLOBULIN PLAS-MCNC: 3.1 G/DL (ref 2.8–4.4)
GLUCOSE BLD-MCNC: 141 MG/DL (ref 70–99)
HBA1C MFR BLD HPLC: 7.3 % (ref ?–5.7)
HCT VFR BLD AUTO: 48.9 %
HDLC SERPL-MCNC: 42 MG/DL (ref 40–59)
HGB BLD-MCNC: 15.8 G/DL
IMM GRANULOCYTES # BLD AUTO: 0.07 X10(3) UL (ref 0–1)
IMM GRANULOCYTES NFR BLD: 0.8 %
LDLC SERPL CALC-MCNC: 91 MG/DL (ref ?–100)
LYMPHOCYTES # BLD AUTO: 2.78 X10(3) UL (ref 1–4)
LYMPHOCYTES NFR BLD AUTO: 31.4 %
M PROTEIN MFR SERPL ELPH: 7.3 G/DL (ref 6.4–8.2)
MCH RBC QN AUTO: 29.6 PG (ref 26–34)
MCHC RBC AUTO-ENTMCNC: 32.3 G/DL (ref 31–37)
MCV RBC AUTO: 91.7 FL
MICROALBUMIN UR-MCNC: 1.81 MG/DL
MICROALBUMIN/CREAT 24H UR-RTO: 15.7 UG/MG (ref ?–30)
MONOCYTES # BLD AUTO: 0.83 X10(3) UL (ref 0.1–1)
MONOCYTES NFR BLD AUTO: 9.4 %
NEUTROPHILS # BLD AUTO: 4.98 X10 (3) UL (ref 1.5–7.7)
NEUTROPHILS # BLD AUTO: 4.98 X10(3) UL (ref 1.5–7.7)
NEUTROPHILS NFR BLD AUTO: 56.3 %
NONHDLC SERPL-MCNC: 117 MG/DL (ref ?–130)
OSMOLALITY SERPL CALC.SUM OF ELEC: 300 MOSM/KG (ref 275–295)
PATIENT FASTING Y/N/NP: YES
PATIENT FASTING Y/N/NP: YES
PLATELET # BLD AUTO: 243 10(3)UL (ref 150–450)
POTASSIUM SERPL-SCNC: 4.6 MMOL/L (ref 3.5–5.1)
RBC # BLD AUTO: 5.33 X10(6)UL
SODIUM SERPL-SCNC: 141 MMOL/L (ref 136–145)
TRIGL SERPL-MCNC: 129 MG/DL (ref 30–149)
TSI SER-ACNC: 2.08 MIU/ML (ref 0.36–3.74)
VLDLC SERPL CALC-MCNC: 26 MG/DL (ref 0–30)
WBC # BLD AUTO: 8.8 X10(3) UL (ref 4–11)

## 2021-03-19 PROCEDURE — 82570 ASSAY OF URINE CREATININE: CPT

## 2021-03-19 PROCEDURE — 36415 COLL VENOUS BLD VENIPUNCTURE: CPT

## 2021-03-19 PROCEDURE — 83036 HEMOGLOBIN GLYCOSYLATED A1C: CPT

## 2021-03-19 PROCEDURE — 82043 UR ALBUMIN QUANTITATIVE: CPT

## 2021-03-19 PROCEDURE — 80061 LIPID PANEL: CPT

## 2021-03-19 PROCEDURE — 85025 COMPLETE CBC W/AUTO DIFF WBC: CPT

## 2021-03-19 PROCEDURE — 84443 ASSAY THYROID STIM HORMONE: CPT

## 2021-03-19 PROCEDURE — 80053 COMPREHEN METABOLIC PANEL: CPT

## 2021-03-22 ENCOUNTER — OFFICE VISIT (OUTPATIENT)
Dept: INTERNAL MEDICINE CLINIC | Facility: CLINIC | Age: 76
End: 2021-03-22
Payer: MEDICARE

## 2021-03-22 ENCOUNTER — LAB ENCOUNTER (OUTPATIENT)
Dept: LAB | Age: 76
End: 2021-03-22
Attending: INTERNAL MEDICINE
Payer: MEDICARE

## 2021-03-22 VITALS
HEART RATE: 78 BPM | BODY MASS INDEX: 40.88 KG/M2 | DIASTOLIC BLOOD PRESSURE: 94 MMHG | HEIGHT: 71 IN | WEIGHT: 292 LBS | SYSTOLIC BLOOD PRESSURE: 165 MMHG

## 2021-03-22 DIAGNOSIS — E79.0 HYPERURICEMIA: ICD-10-CM

## 2021-03-22 DIAGNOSIS — G47.33 OBSTRUCTIVE SLEEP APNEA: ICD-10-CM

## 2021-03-22 DIAGNOSIS — I70.0 AORTIC ATHEROSCLEROSIS (HCC): ICD-10-CM

## 2021-03-22 DIAGNOSIS — D69.2 SENILE PURPURA (HCC): ICD-10-CM

## 2021-03-22 DIAGNOSIS — E66.01 MORBID OBESITY (HCC): ICD-10-CM

## 2021-03-22 DIAGNOSIS — E11.9 TYPE 2 DIABETES MELLITUS WITHOUT COMPLICATION, WITHOUT LONG-TERM CURRENT USE OF INSULIN (HCC): ICD-10-CM

## 2021-03-22 DIAGNOSIS — Z86.73 HISTORY OF CVA (CEREBROVASCULAR ACCIDENT): ICD-10-CM

## 2021-03-22 DIAGNOSIS — M47.819 ARTHRITIS OF LOW BACK: ICD-10-CM

## 2021-03-22 DIAGNOSIS — E78.5 DYSLIPIDEMIA: ICD-10-CM

## 2021-03-22 DIAGNOSIS — Z12.5 ENCOUNTER FOR SCREENING FOR MALIGNANT NEOPLASM OF PROSTATE: ICD-10-CM

## 2021-03-22 DIAGNOSIS — Z12.11 ENCOUNTER FOR SCREENING COLONOSCOPY: ICD-10-CM

## 2021-03-22 DIAGNOSIS — I10 ESSENTIAL HYPERTENSION: ICD-10-CM

## 2021-03-22 DIAGNOSIS — Z00.00 ENCOUNTER FOR ANNUAL HEALTH EXAMINATION: Primary | ICD-10-CM

## 2021-03-22 DIAGNOSIS — N18.30 STAGE 3 CHRONIC KIDNEY DISEASE, UNSPECIFIED WHETHER STAGE 3A OR 3B CKD (HCC): ICD-10-CM

## 2021-03-22 DIAGNOSIS — M15.9 PRIMARY OSTEOARTHRITIS INVOLVING MULTIPLE JOINTS: ICD-10-CM

## 2021-03-22 DIAGNOSIS — Z12.11 COLON CANCER SCREENING: ICD-10-CM

## 2021-03-22 DIAGNOSIS — Q21.1 ASD (ATRIAL SEPTAL DEFECT): ICD-10-CM

## 2021-03-22 LAB — COMPLEXED PSA SERPL-MCNC: 0.57 NG/ML (ref ?–4)

## 2021-03-22 PROCEDURE — 36415 COLL VENOUS BLD VENIPUNCTURE: CPT

## 2021-03-22 PROCEDURE — G0439 PPPS, SUBSEQ VISIT: HCPCS | Performed by: INTERNAL MEDICINE

## 2021-03-22 NOTE — PATIENT INSTRUCTIONS
Reanna [facility]'s SCREENING SCHEDULE   Tests on this list are recommended by your physician but may not be covered, or covered at this frequency, by your insurer. Please check with your insurance carrier before scheduling to verify coverage.     PREVENT to Age 77     Colonoscopy Screen   Covered every 10 years- more often if abnormal Colonoscopy due on 12/06/2018 Update Health Maintenance if applicable    Flex Sigmoidoscopy Screen  Covered every 5 years No results found for this or any previous visit.  No visit. This may be covered with your prescription benefits, but Medicare does not cover unless Medically needed    Zoster (Not covered by Medicare Part B) No orders found for this or any previous visit.  This may be covered with your pharmacy  prescription

## 2021-03-22 NOTE — PROGRESS NOTES
HPI:   Josy Wright is a 77year old male who presents for a Medicare Subsequent Annual Wellness visit (Pt already had Initial Annual Wellness).     Patient comes for his Medicare annual wellness visit  States that his blood pressure is elevated toda Activities based on screening of functional status.    Problems with daily activities? : Yes       Depression Screening (PHQ-2/PHQ-9): Over the LAST 2 WEEKS   Little interest or pleasure in doing things: Not at all  Feeling down, depressed, or hopeless: Not unspecified whether stage 3a or 3b CKD     Senile purpura (HCC)     Arthritis of low back ([facility] [address]    Wt Readings from Last 3 Encounters:  03/22/21 : 292 lb (132.5 kg)  11/17/20 : 294 lb (133.4 kg)  09/11/20 : 291 lb 9.6 oz (132.3 kg)     Last Cholesterol Lab tablet by mouth daily. aspirin 325 MG Oral Tab, Take 325 mg by mouth daily. vitamin E 1000 UNITS Oral Cap, Take 1,000 Units by mouth every morning. Vitamin C (VITAMIN C) 500 MG Oral Tab, Take 500 mg by mouth every morning.          MEDICAL INFORMATION: history  ALL/ASTHMA: denies hx of allergy or asthma    EXAM:   BP (!) 165/94   Pulse 78   Ht 5' 11\" (1.803 m)   Wt 292 lb (132.5 kg)   BMI 40.73 kg/m²   Estimated body mass index is 40.73 kg/m² as calculated from the following:    Height as of this encoun bilaterally, extraocular muscles intact  NECK: supple,no adenopathy, nontender  LUNGS: clear to auscultation, no wheeze  CARDIO: RRR without murmur  GI: good BS's,no masses or tenderness  EXTREMITIES: +traceedema  Rectal exam, no lesions on the outside, go Advised to follow low-fat low-cholesterol diet and exercise as tolerated, continue medications    Hyperuricemia  Stable on allopurinol, continue     dm2 without complications   ESQ6U 0.1 on 9/19 and 6.9 on 9/2020 and 7.3 on 3/2021   U.  TSVYG + 0/47 and o 12/06/2018 Update Health Maintenance if applicable    Flex Sigmoidoscopy Screen every 10 years No results found for this or any previous visit. No flowsheet data found. Fecal Occult Blood Annually No results found for: FOB No flowsheet data found.     G Creatinine  Annually Creatinine (mg/dL)   Date Value   03/19/2021 1.26    No flowsheet data found. Drug Serum Conc  Annually No results found for: DIGOXIN, DIG, VALP No flowsheet data found.        Diabetes      HgbA1C  Annually HEMOGLOBIN A1C (%)   D

## 2021-03-23 ENCOUNTER — OFFICE VISIT (OUTPATIENT)
Dept: NEPHROLOGY | Facility: CLINIC | Age: 76
End: 2021-03-23
Payer: MEDICARE

## 2021-03-23 VITALS
SYSTOLIC BLOOD PRESSURE: 140 MMHG | DIASTOLIC BLOOD PRESSURE: 82 MMHG | WEIGHT: 292 LBS | HEART RATE: 71 BPM | BODY MASS INDEX: 40.88 KG/M2 | HEIGHT: 71 IN

## 2021-03-23 DIAGNOSIS — N18.30 STAGE 3 CHRONIC KIDNEY DISEASE, UNSPECIFIED WHETHER STAGE 3A OR 3B CKD (HCC): Primary | ICD-10-CM

## 2021-03-23 PROCEDURE — 99214 OFFICE O/P EST MOD 30 MIN: CPT | Performed by: INTERNAL MEDICINE

## 2021-03-23 NOTE — PROGRESS NOTES
Progress Note     Jaren March is a 77 yrs old male with pmh of DM x 20 yrs, HTN X 20 yrs, CKD stage II, sleep apnea, morbidly obese on CPAP, right hip replacement, CVA who presented today for follow up     Patient baseline creatinine is around 1.0- ankle   • TOTAL HIP REPLACEMENT Right 01/2013   • VASECTOMY  1982           Medications (Active prior to today's visit):  Current Outpatient Medications   Medication Sig Dispense Refill   • Glucose Blood (ONETOUCH VERIO) In Vitro Strip Check glucose once d discharge and vision loss  Cardiovascular:  Negative for chest pain, sob  Respiratory:  Negative for cough, dyspnea and wheezing  Gastrointestinal:  Negative for abdominal pain, constipation  Genitourinary:  Negative for dysuria and hematuria  Hema/Lymph:  BID and losartan 100mg daily   - home BP in 130s range  - weight stable in 290s   - improve leg swelling post discontinuation of amlodipine     Follow up in 6 months       Orders This Visit:  No orders of the defined types were placed in this encounter.

## 2021-04-08 ENCOUNTER — OFFICE VISIT (OUTPATIENT)
Dept: ENDOCRINOLOGY CLINIC | Facility: CLINIC | Age: 76
End: 2021-04-08
Payer: MEDICARE

## 2021-04-08 VITALS
DIASTOLIC BLOOD PRESSURE: 77 MMHG | WEIGHT: 294 LBS | HEART RATE: 79 BPM | BODY MASS INDEX: 41 KG/M2 | SYSTOLIC BLOOD PRESSURE: 119 MMHG

## 2021-04-08 DIAGNOSIS — E11.65 UNCONTROLLED TYPE 2 DIABETES MELLITUS WITH HYPERGLYCEMIA (HCC): Primary | ICD-10-CM

## 2021-04-08 PROCEDURE — 82947 ASSAY GLUCOSE BLOOD QUANT: CPT | Performed by: INTERNAL MEDICINE

## 2021-04-08 PROCEDURE — 99213 OFFICE O/P EST LOW 20 MIN: CPT | Performed by: INTERNAL MEDICINE

## 2021-04-08 PROCEDURE — 36416 COLLJ CAPILLARY BLOOD SPEC: CPT | Performed by: INTERNAL MEDICINE

## 2021-04-08 NOTE — PROGRESS NOTES
Name: Jo Guevara  Date: 4/8/2021    Referring Physician: No ref. provider found    HISTORY OF PRESENT ILLNESS   Jo Guevara is a 77year old male who presents for diabetes mellitus, diagnosed at age 49.       Prior HbA, C or glycohemoglobin wer total) by mouth daily. , Disp: 90 tablet, Rfl: 3  •  Levothyroxine Sodium 50 MCG Oral Tab, Take 1 tablet (50 mcg total) by mouth before breakfast. Please take 30-60 minutes before eating., Disp: 90 tablet, Rfl: 3  •  glipiZIDE ER 10 MG Oral Tablet 24 Hr, Ta Drug use: No    Other Topics      Concerns:        Caffeine Concern: Yes          coffee, soda, 3 cups daily      Medical History:   Past Medical History:   Diagnosis Date   • Acute, but ill-defined, cerebrovascular disease    • Aortic atherosclerosis ([facility] [address] glycemic control to prevent complications of diabetes  -Discussed complications of diabetes include retinopathy, neuropathy, nephropathy and cardiovascular disease  -Discussed importance of SBGM  -Discussed importance of low CHO diet, recommend 45gm per me

## 2021-07-25 DIAGNOSIS — E03.9 HYPOTHYROIDISM, UNSPECIFIED TYPE: ICD-10-CM

## 2021-07-25 DIAGNOSIS — E11.65 UNCONTROLLED TYPE 2 DIABETES MELLITUS WITH HYPERGLYCEMIA (HCC): ICD-10-CM

## 2021-07-25 DIAGNOSIS — E11.9 TYPE 2 DIABETES MELLITUS WITHOUT COMPLICATION, WITHOUT LONG-TERM CURRENT USE OF INSULIN (HCC): ICD-10-CM

## 2021-07-25 DIAGNOSIS — E78.5 HYPERLIPIDEMIA LDL GOAL <100: ICD-10-CM

## 2021-07-25 DIAGNOSIS — I10 ESSENTIAL HYPERTENSION: ICD-10-CM

## 2021-07-25 DIAGNOSIS — E79.0 HYPERURICEMIA: ICD-10-CM

## 2021-07-25 RX ORDER — LOSARTAN POTASSIUM 100 MG/1
TABLET ORAL
Qty: 90 TABLET | Refills: 3 | Status: SHIPPED | OUTPATIENT
Start: 2021-07-25

## 2021-07-25 RX ORDER — GLIPIZIDE 10 MG/1
TABLET, FILM COATED, EXTENDED RELEASE ORAL
Qty: 180 TABLET | Refills: 3 | Status: SHIPPED | OUTPATIENT
Start: 2021-07-25

## 2021-07-25 RX ORDER — PRAVASTATIN SODIUM 40 MG
TABLET ORAL
Qty: 90 TABLET | Refills: 3 | Status: SHIPPED | OUTPATIENT
Start: 2021-07-25

## 2021-07-25 RX ORDER — LEVOTHYROXINE SODIUM 50 MCG
TABLET ORAL
Qty: 90 TABLET | Refills: 3 | Status: SHIPPED | OUTPATIENT
Start: 2021-07-25

## 2021-07-25 RX ORDER — ALLOPURINOL 100 MG/1
TABLET ORAL
Qty: 90 TABLET | Refills: 3 | Status: SHIPPED | OUTPATIENT
Start: 2021-07-25

## 2021-07-25 RX ORDER — CARVEDILOL 3.12 MG/1
TABLET ORAL
Qty: 180 TABLET | Refills: 3 | Status: SHIPPED | OUTPATIENT
Start: 2021-07-25

## 2021-07-25 RX ORDER — FENOFIBRATE 160 MG/1
TABLET ORAL
Qty: 90 TABLET | Refills: 3 | Status: SHIPPED | OUTPATIENT
Start: 2021-07-25

## 2021-07-26 RX ORDER — CANAGLIFLOZIN 100 MG/1
TABLET, FILM COATED ORAL
Qty: 90 TABLET | Refills: 1 | Status: SHIPPED | OUTPATIENT
Start: 2021-07-26 | End: 2022-01-10

## 2021-08-19 ENCOUNTER — TELEPHONE (OUTPATIENT)
Dept: INTERNAL MEDICINE CLINIC | Facility: CLINIC | Age: 76
End: 2021-08-19

## 2021-09-09 ENCOUNTER — PATIENT MESSAGE (OUTPATIENT)
Dept: INTERNAL MEDICINE CLINIC | Facility: CLINIC | Age: 76
End: 2021-09-09

## 2021-09-09 ENCOUNTER — PATIENT MESSAGE (OUTPATIENT)
Dept: ENDOCRINOLOGY CLINIC | Facility: CLINIC | Age: 76
End: 2021-09-09

## 2021-09-09 ENCOUNTER — PATIENT MESSAGE (OUTPATIENT)
Dept: NEPHROLOGY | Facility: CLINIC | Age: 76
End: 2021-09-09

## 2021-09-09 DIAGNOSIS — E11.9 CONTROLLED TYPE 2 DIABETES MELLITUS WITHOUT COMPLICATION, WITHOUT LONG-TERM CURRENT USE OF INSULIN (HCC): Primary | ICD-10-CM

## 2021-09-09 NOTE — TELEPHONE ENCOUNTER
From: Eliezer Doyle  To: Mamadou Garza MD  Sent: 9/9/2021 9:57 AM CDT  Subject: Other    If Dr Breonna Hand could put in referral for blood work she wants done so I have it done prior to my October 1 appointment   Thanks Elie Rollins

## 2021-09-09 NOTE — TELEPHONE ENCOUNTER
From: Irene Joe  To: Ajay Del Rosario MD  Sent: 9/9/2021 9:59 AM CDT  Subject: Other    I have an appointment the last week of September could  please put in a referral for any blood work she wants done for appt.    Shadi Porter

## 2021-09-09 NOTE — TELEPHONE ENCOUNTER
I see Dr. Mendel Downy has placed lab tests - when you planning to do it ?  See if it can be done couple of days prior to visit with me

## 2021-09-09 NOTE — TELEPHONE ENCOUNTER
Follow up for DM and hypothyroidism. Last labs done 3/2021 for thyroid and diabetes. Please advise if you would like repeat labs prior to appt.

## 2021-09-10 NOTE — TELEPHONE ENCOUNTER
From: Iggy Terry  To:  Eboni Aguilera MD  Sent: 9/9/2021 9:53 AM CDT  Subject: Other    I have an appointment the last week of September could doctor put in referral for any blood work she wants done for that appointment   Jenn Lynn

## 2021-09-13 ENCOUNTER — LAB ENCOUNTER (OUTPATIENT)
Dept: LAB | Facility: HOSPITAL | Age: 76
End: 2021-09-13
Attending: INTERNAL MEDICINE
Payer: MEDICARE

## 2021-09-13 DIAGNOSIS — E11.9 CONTROLLED TYPE 2 DIABETES MELLITUS WITHOUT COMPLICATION, WITHOUT LONG-TERM CURRENT USE OF INSULIN (HCC): ICD-10-CM

## 2021-09-13 LAB
ALBUMIN SERPL-MCNC: 4 G/DL (ref 3.4–5)
ALBUMIN/GLOB SERPL: 1.2 {RATIO} (ref 1–2)
ALP LIVER SERPL-CCNC: 38 U/L
ALT SERPL-CCNC: 41 U/L
ANION GAP SERPL CALC-SCNC: 4 MMOL/L (ref 0–18)
AST SERPL-CCNC: 29 U/L (ref 15–37)
BILIRUB SERPL-MCNC: 0.4 MG/DL (ref 0.1–2)
BUN BLD-MCNC: 23 MG/DL (ref 7–18)
BUN/CREAT SERPL: 19.3 (ref 10–20)
CALCIUM BLD-MCNC: 9.2 MG/DL (ref 8.5–10.1)
CHLORIDE SERPL-SCNC: 113 MMOL/L (ref 98–112)
CHOLEST SMN-MCNC: 150 MG/DL (ref ?–200)
CO2 SERPL-SCNC: 24 MMOL/L (ref 21–32)
CREAT BLD-MCNC: 1.19 MG/DL
CREAT UR-SCNC: 154 MG/DL
EST. AVERAGE GLUCOSE BLD GHB EST-MCNC: 166 MG/DL (ref 68–126)
GLOBULIN PLAS-MCNC: 3.3 G/DL (ref 2.8–4.4)
GLUCOSE BLD-MCNC: 123 MG/DL (ref 70–99)
HBA1C MFR BLD HPLC: 7.4 % (ref ?–5.7)
HDLC SERPL-MCNC: 34 MG/DL (ref 40–59)
LDLC SERPL CALC-MCNC: 88 MG/DL (ref ?–100)
M PROTEIN MFR SERPL ELPH: 7.3 G/DL (ref 6.4–8.2)
MICROALBUMIN UR-MCNC: 2.12 MG/DL
MICROALBUMIN/CREAT 24H UR-RTO: 13.8 UG/MG (ref ?–30)
NONHDLC SERPL-MCNC: 116 MG/DL (ref ?–130)
OSMOLALITY SERPL CALC.SUM OF ELEC: 297 MOSM/KG (ref 275–295)
PATIENT FASTING Y/N/NP: YES
PATIENT FASTING Y/N/NP: YES
POTASSIUM SERPL-SCNC: 4.6 MMOL/L (ref 3.5–5.1)
SODIUM SERPL-SCNC: 141 MMOL/L (ref 136–145)
TRIGL SERPL-MCNC: 159 MG/DL (ref 30–149)
TSI SER-ACNC: 2.65 MIU/ML (ref 0.36–3.74)
VLDLC SERPL CALC-MCNC: 26 MG/DL (ref 0–30)

## 2021-09-13 PROCEDURE — 80053 COMPREHEN METABOLIC PANEL: CPT

## 2021-09-13 PROCEDURE — 36415 COLL VENOUS BLD VENIPUNCTURE: CPT

## 2021-09-13 PROCEDURE — 82570 ASSAY OF URINE CREATININE: CPT

## 2021-09-13 PROCEDURE — 83036 HEMOGLOBIN GLYCOSYLATED A1C: CPT

## 2021-09-13 PROCEDURE — 80061 LIPID PANEL: CPT

## 2021-09-13 PROCEDURE — 82043 UR ALBUMIN QUANTITATIVE: CPT

## 2021-09-13 PROCEDURE — 84443 ASSAY THYROID STIM HORMONE: CPT

## 2021-09-28 ENCOUNTER — OFFICE VISIT (OUTPATIENT)
Dept: INTERNAL MEDICINE CLINIC | Facility: CLINIC | Age: 76
End: 2021-09-28
Payer: MEDICARE

## 2021-09-28 VITALS
DIASTOLIC BLOOD PRESSURE: 74 MMHG | HEART RATE: 61 BPM | WEIGHT: 290 LBS | HEIGHT: 71 IN | RESPIRATION RATE: 16 BRPM | BODY MASS INDEX: 40.6 KG/M2 | SYSTOLIC BLOOD PRESSURE: 126 MMHG

## 2021-09-28 DIAGNOSIS — E11.9 TYPE 2 DIABETES MELLITUS WITHOUT COMPLICATION, WITHOUT LONG-TERM CURRENT USE OF INSULIN (HCC): ICD-10-CM

## 2021-09-28 DIAGNOSIS — N18.30 STAGE 3 CHRONIC KIDNEY DISEASE, UNSPECIFIED WHETHER STAGE 3A OR 3B CKD (HCC): ICD-10-CM

## 2021-09-28 DIAGNOSIS — I10 ESSENTIAL HYPERTENSION: Primary | ICD-10-CM

## 2021-09-28 PROCEDURE — 99214 OFFICE O/P EST MOD 30 MIN: CPT | Performed by: INTERNAL MEDICINE

## 2021-09-28 NOTE — PROGRESS NOTES
Oseas Fleming is a 77year old male. Patient presents with:   Follow - Up      HPI:   Pt comes for f/u  C/c f/u   C/o overall doing well   Sees ortho dr Cal Miles - hip is doing well, last inj to knees was dec/jan -   renal dr Nora Grigsby this week and also mL 3   • Multiple Vitamin (MULTI-VITAMIN DAILY) Oral Tab Take 1 tablet by mouth daily. • aspirin 325 MG Oral Tab Take 325 mg by mouth daily. • vitamin E 1000 UNITS Oral Cap Take 1,000 Units by mouth every morning.      • Vitamin C (VITAMIN C) 500 MG feet  NEURO: denies headaches , anxiety, depression    EXAM:   /74 (BP Location: Right arm, Patient Position: Sitting, Cuff Size: adult)   Pulse 61   Resp 16   Ht 5' 11\" (1.803 m)   Wt 290 lb (131.5 kg)   BMI 40.45 kg/m²   GENERAL: well developed, w

## 2021-09-29 ENCOUNTER — TELEPHONE (OUTPATIENT)
Dept: PULMONOLOGY | Facility: CLINIC | Age: 76
End: 2021-09-29

## 2021-09-30 ENCOUNTER — OFFICE VISIT (OUTPATIENT)
Dept: NEPHROLOGY | Facility: CLINIC | Age: 76
End: 2021-09-30
Payer: MEDICARE

## 2021-09-30 VITALS
BODY MASS INDEX: 40.46 KG/M2 | HEIGHT: 71 IN | DIASTOLIC BLOOD PRESSURE: 74 MMHG | TEMPERATURE: 98 F | SYSTOLIC BLOOD PRESSURE: 124 MMHG | HEART RATE: 68 BPM | WEIGHT: 289 LBS

## 2021-09-30 DIAGNOSIS — N18.30 STAGE 3 CHRONIC KIDNEY DISEASE, UNSPECIFIED WHETHER STAGE 3A OR 3B CKD (HCC): Primary | ICD-10-CM

## 2021-09-30 PROCEDURE — 99213 OFFICE O/P EST LOW 20 MIN: CPT | Performed by: INTERNAL MEDICINE

## 2021-09-30 NOTE — PROGRESS NOTES
Progress Note     Suszanne Severe is a 77 yrs old male with pmh of DM x 20 yrs, HTN X 20 yrs, CKD stage II, sleep apnea, morbidly obese on CPAP, right hip replacement, CVA who presented today for follow up     Patient baseline creatinine is around 1.0- Medications (Active prior to today's visit):  Current Outpatient Medications   Medication Sig Dispense Refill   • INVOKANA 100 MG Oral Tab TAKE 1 TABLET DAILY 90 tablet 1   • METFORMIN  MG Oral Tab TAKE 1 TABLET TWICE DAILY  WITH MEALS 180 ta hematuria  Hema/Lymph:  Negative for easy bleeding and easy bruising  Integumentary:  Negative for pruritus and rash  Musculoskeletal:  Negative for bone/joint symptoms  Neurological:  Negative for gait disturbance  Psychiatric:  Negative for inappropriate

## 2021-10-01 ENCOUNTER — OFFICE VISIT (OUTPATIENT)
Dept: ENDOCRINOLOGY CLINIC | Facility: CLINIC | Age: 76
End: 2021-10-01
Payer: MEDICARE

## 2021-10-01 VITALS
BODY MASS INDEX: 40 KG/M2 | WEIGHT: 290 LBS | HEART RATE: 69 BPM | SYSTOLIC BLOOD PRESSURE: 137 MMHG | DIASTOLIC BLOOD PRESSURE: 74 MMHG

## 2021-10-01 DIAGNOSIS — E11.9 CONTROLLED TYPE 2 DIABETES MELLITUS WITHOUT COMPLICATION, WITHOUT LONG-TERM CURRENT USE OF INSULIN (HCC): Primary | ICD-10-CM

## 2021-10-01 DIAGNOSIS — E03.9 HYPOTHYROIDISM, UNSPECIFIED TYPE: ICD-10-CM

## 2021-10-01 PROCEDURE — 36416 COLLJ CAPILLARY BLOOD SPEC: CPT | Performed by: INTERNAL MEDICINE

## 2021-10-01 PROCEDURE — 99213 OFFICE O/P EST LOW 20 MIN: CPT | Performed by: INTERNAL MEDICINE

## 2021-10-01 PROCEDURE — 83036 HEMOGLOBIN GLYCOSYLATED A1C: CPT | Performed by: INTERNAL MEDICINE

## 2021-10-01 PROCEDURE — 82947 ASSAY GLUCOSE BLOOD QUANT: CPT | Performed by: INTERNAL MEDICINE

## 2021-10-01 NOTE — PROGRESS NOTES
Name: Christiano Geronimo  Date: 10/1/2021    Referring Physician: No ref. provider found    HISTORY OF PRESENT ILLNESS   Christiano Geronimo is a 77year old male who presents for diabetes mellitus, diagnosed at age 49.       Prior HbA, C or glycohemoglobin we MEALS, Disp: 180 tablet, Rfl: 3  •  ALLOPURINOL 100 MG Oral Tab, TAKE 1 TABLET DAILY, Disp: 90 tablet, Rfl: 3  •  LOSARTAN 100 MG Oral Tab, TAKE 1 TABLET DAILY        *REPLACES LOSARTAN 50MG, Disp: 90 tablet, Rfl: 3  •  FENOFIBRATE 160 MG Oral Tab, TAKE 1 WILD 9-16   • Chronic kidney disease, stage II (mild)    • Diabetes ([facility] [address]    • [facility]    • Dyslipidemia    • Essential hypertension    • Hearing loss    • History of CVA (cerebrovascular accident)     Multiple, asymptomatic   • Hyperlipidem Trulicity   -Continue Glipizide 10mg PO BID   -Change metformin 500mg PO BID   -Continue Invokana 100mg PO daily, verbalized understanding of risks and benefits   -UTD with optho   -Renal function improved  -BP normal at home - will follow up with Dr. Jeffry Ragsdale

## 2021-10-01 NOTE — TELEPHONE ENCOUNTER
Spoke with pt he has registered his machine and waiting for replacement. Informed pt to contact [facility] Mojgan Menon for updates and it is hie personal choice to continue to use cpap. Pt voiced understanding and denies any further questions at this time.

## 2021-11-08 ENCOUNTER — IMMUNIZATION (OUTPATIENT)
Dept: LAB | Facility: HOSPITAL | Age: 76
End: 2021-11-08
Attending: EMERGENCY MEDICINE
Payer: MEDICARE

## 2021-11-08 DIAGNOSIS — Z23 NEED FOR VACCINATION: Primary | ICD-10-CM

## 2021-11-08 PROCEDURE — 0064A SARSCOV2 VAC 50MCG/0.25ML IM: CPT

## 2021-11-17 ENCOUNTER — TELEPHONE (OUTPATIENT)
Dept: INTERNAL MEDICINE CLINIC | Facility: CLINIC | Age: 76
End: 2021-11-17

## 2022-01-10 DIAGNOSIS — E11.65 UNCONTROLLED TYPE 2 DIABETES MELLITUS WITH HYPERGLYCEMIA (HCC): ICD-10-CM

## 2022-01-11 RX ORDER — CANAGLIFLOZIN 100 MG/1
TABLET, FILM COATED ORAL
Qty: 90 TABLET | Refills: 0 | Status: SHIPPED | OUTPATIENT
Start: 2022-01-11

## 2022-02-22 ENCOUNTER — OFFICE VISIT (OUTPATIENT)
Dept: PULMONOLOGY | Facility: CLINIC | Age: 77
End: 2022-02-22
Payer: MEDICARE

## 2022-02-22 VITALS
HEART RATE: 77 BPM | HEIGHT: 71 IN | RESPIRATION RATE: 16 BRPM | OXYGEN SATURATION: 97 % | WEIGHT: 290 LBS | BODY MASS INDEX: 40.6 KG/M2 | SYSTOLIC BLOOD PRESSURE: 144 MMHG | DIASTOLIC BLOOD PRESSURE: 86 MMHG

## 2022-02-22 DIAGNOSIS — G47.33 OBSTRUCTIVE SLEEP APNEA: Primary | ICD-10-CM

## 2022-02-22 PROCEDURE — 99213 OFFICE O/P EST LOW 20 MIN: CPT | Performed by: INTERNAL MEDICINE

## 2022-04-14 ENCOUNTER — OFFICE VISIT (OUTPATIENT)
Dept: OTOLARYNGOLOGY | Facility: CLINIC | Age: 77
End: 2022-04-14
Payer: MEDICARE

## 2022-04-14 VITALS — BODY MASS INDEX: 40.6 KG/M2 | WEIGHT: 290 LBS | HEIGHT: 71 IN | TEMPERATURE: 97 F

## 2022-04-14 DIAGNOSIS — K13.79 LESION OF PALATE: Primary | ICD-10-CM

## 2022-04-14 PROCEDURE — 99203 OFFICE O/P NEW LOW 30 MIN: CPT | Performed by: OTOLARYNGOLOGY

## 2022-04-14 RX ORDER — DULAGLUTIDE 1.5 MG/.5ML
0.5 INJECTION, SOLUTION SUBCUTANEOUS
Qty: 6 ML | Refills: 1 | Status: SHIPPED | OUTPATIENT
Start: 2022-04-14

## 2022-04-15 RX ORDER — CANAGLIFLOZIN 100 MG/1
TABLET, FILM COATED ORAL
Qty: 90 TABLET | Refills: 0 | Status: SHIPPED | OUTPATIENT
Start: 2022-04-15

## 2022-04-15 NOTE — TELEPHONE ENCOUNTER
LOV: 10/01/21 RTC 6 months -- seen by Dr. Kenia Cardona    No future appointments. Appointment reminder sent via [employer].

## 2022-04-15 NOTE — TELEPHONE ENCOUNTER
Please review refill protocol failed/ no protocol  Requested Prescriptions   Pending Prescriptions Disp Refills    TRULICITY 1.5 LS/3.7XG Subcutaneous Solution Pen-injector [Pharmacy Med Name: TRULICITY(4) PEN 8.3/4.0] 6 mL 1     Sig: INJECT 0.5ML (=1.5 MG)     SUBCUTANEOUSLY EVERY 7 DAYS        Diabetes Medication Protocol Failed - 4/14/2022  9:19 PM        Failed - Last A1C < 7.5 and within past 6 months     Lab Results   Component Value Date    A1C 6.8 (A) 10/01/2021               Failed - Appointment in past 6 or next 3 months        Passed - GFR Non- > 50     Lab Results   Component Value Date    GFRNAA 60 09/13/2021                 Passed - GFR in the past 12 months

## 2022-04-16 ENCOUNTER — OFFICE VISIT (OUTPATIENT)
Dept: OTOLARYNGOLOGY | Facility: CLINIC | Age: 77
End: 2022-04-16
Payer: MEDICARE

## 2022-04-16 VITALS — HEIGHT: 71 IN | WEIGHT: 290 LBS | BODY MASS INDEX: 40.6 KG/M2

## 2022-04-16 DIAGNOSIS — K13.79 LESION OF PALATE: Primary | ICD-10-CM

## 2022-04-16 PROCEDURE — 88305 TISSUE EXAM BY PATHOLOGIST: CPT | Performed by: OTOLARYNGOLOGY

## 2022-04-28 ENCOUNTER — PATIENT MESSAGE (OUTPATIENT)
Dept: INTERNAL MEDICINE CLINIC | Facility: CLINIC | Age: 77
End: 2022-04-28

## 2022-04-28 NOTE — TELEPHONE ENCOUNTER
Call placed to patient who endorses covid exposure 4 days ago. Has symptom onset since yesterday  Took home Covid test today was negative  Cough  Hiccups  Body aches  Fever has resolved  Denies: shortness of breath, chest pain or difficulty breathing  Orders entered for Covid PCR testing for day 5 (tomorrow)  Patient scheduled for video visit for additional recommendations regarding symptom management  HomeCare, Isolation guidelines and ED precautions reviewed    Patient verbalizes understanding and agrees with plan    Patient scheduled for video visit. Patient advised to complete the e-check in in [employer], if active. Understands to follow the prompts and links to complete the visit. Patient advised that there may be a co-pay involved in this type of visit. Patient agreed to proceed, they understand the provider may be calling from a blocked, or unknown phone number on their caller ID and they know to answer the phone.     Best call back:  [phone]

## 2022-04-29 ENCOUNTER — LAB ENCOUNTER (OUTPATIENT)
Dept: LAB | Age: 77
End: 2022-04-29
Attending: INTERNAL MEDICINE
Payer: MEDICARE

## 2022-04-29 ENCOUNTER — TELEMEDICINE (OUTPATIENT)
Dept: INTERNAL MEDICINE CLINIC | Facility: CLINIC | Age: 77
End: 2022-04-29

## 2022-04-29 DIAGNOSIS — R05.8 COUGH WITH EXPOSURE TO COVID-19 VIRUS: Primary | ICD-10-CM

## 2022-04-29 DIAGNOSIS — Z11.52 ENCOUNTER FOR SCREENING FOR COVID-19: ICD-10-CM

## 2022-04-29 DIAGNOSIS — Z20.822 COUGH WITH EXPOSURE TO COVID-19 VIRUS: Primary | ICD-10-CM

## 2022-04-29 PROCEDURE — 99213 OFFICE O/P EST LOW 20 MIN: CPT | Performed by: NURSE PRACTITIONER

## 2022-04-29 RX ORDER — AZITHROMYCIN 250 MG/1
TABLET, FILM COATED ORAL
Qty: 6 TABLET | Refills: 0 | Status: SHIPPED | OUTPATIENT
Start: 2022-04-29 | End: 2022-05-04

## 2022-04-30 ENCOUNTER — PATIENT MESSAGE (OUTPATIENT)
Dept: INTERNAL MEDICINE CLINIC | Facility: CLINIC | Age: 77
End: 2022-04-30

## 2022-04-30 ENCOUNTER — TELEPHONE (OUTPATIENT)
Dept: INTERNAL MEDICINE CLINIC | Facility: CLINIC | Age: 77
End: 2022-04-30

## 2022-04-30 LAB — SARS-COV-2 RNA RESP QL NAA+PROBE: DETECTED

## 2022-04-30 NOTE — TELEPHONE ENCOUNTER
RN called [address] [facility] to gather information. Patient's date of birth and full name both confirmed. Spoke with Jeramy Zayas who says prescription has already been sent to [facility]. RN called [facility]. They state doctor already called them and clarified needed information.

## 2022-04-30 NOTE — TELEPHONE ENCOUNTER
On-call page received from pharmacy regarding patient. Forwarded message to Michael Pina MD who called and handled the matter.

## 2022-04-30 NOTE — TELEPHONE ENCOUNTER
Marla Monday, RN 4/30/2022 12:15 PM CDT        ----- Message -----  From: Kelly Shepard  Sent: 4/30/2022 11:58 AM CDT  To: Em Rn Triage  Subject: Covid test     I sent a message to Gustavo Schuler that my Covid test came back positive. Just letting you know.

## 2022-06-27 ENCOUNTER — TELEPHONE (OUTPATIENT)
Dept: PULMONOLOGY | Facility: CLINIC | Age: 77
End: 2022-06-27

## 2022-06-28 ENCOUNTER — PATIENT MESSAGE (OUTPATIENT)
Dept: PULMONOLOGY | Facility: CLINIC | Age: 77
End: 2022-06-28

## 2022-06-28 NOTE — TELEPHONE ENCOUNTER
Call placed to [facility] to inquiring what patient needs to do once recall replacement is received. Per Ambar Nanas should come pre-programmed however patient should transfer their data card and/or modem device from old machine to new one which will transfer and program new machine to correct pressure setting. Informed patient of above.

## 2022-06-30 NOTE — TELEPHONE ENCOUNTER
Spoke with patient states he will be receiving his recall replacement machine tomorrow. Confirmed that he needs to transfer data card and modem from old machine and if there are any issues to contact Sarath Cotto. Patient verbalized understanding.

## 2022-07-20 DIAGNOSIS — E11.9 TYPE 2 DIABETES MELLITUS WITHOUT COMPLICATION, WITHOUT LONG-TERM CURRENT USE OF INSULIN (HCC): ICD-10-CM

## 2022-07-20 DIAGNOSIS — E03.9 HYPOTHYROIDISM, UNSPECIFIED TYPE: ICD-10-CM

## 2022-07-20 DIAGNOSIS — I10 ESSENTIAL HYPERTENSION: ICD-10-CM

## 2022-07-20 DIAGNOSIS — E79.0 HYPERURICEMIA: ICD-10-CM

## 2022-07-20 DIAGNOSIS — E78.5 HYPERLIPIDEMIA LDL GOAL <100: ICD-10-CM

## 2022-07-20 RX ORDER — LOSARTAN POTASSIUM 100 MG/1
TABLET ORAL
Qty: 90 TABLET | Refills: 3 | Status: SHIPPED | OUTPATIENT
Start: 2022-07-20

## 2022-07-20 RX ORDER — PRAVASTATIN SODIUM 40 MG
TABLET ORAL
Qty: 90 TABLET | Refills: 3 | Status: SHIPPED | OUTPATIENT
Start: 2022-07-20

## 2022-07-20 RX ORDER — FENOFIBRATE 160 MG/1
TABLET ORAL
Qty: 90 TABLET | Refills: 3 | Status: SHIPPED | OUTPATIENT
Start: 2022-07-20

## 2022-07-20 RX ORDER — GLIPIZIDE 10 MG/1
TABLET, FILM COATED, EXTENDED RELEASE ORAL
Qty: 180 TABLET | Refills: 3 | Status: SHIPPED | OUTPATIENT
Start: 2022-07-20

## 2022-07-20 RX ORDER — CARVEDILOL 3.12 MG/1
TABLET ORAL
Qty: 180 TABLET | Refills: 3 | Status: SHIPPED | OUTPATIENT
Start: 2022-07-20

## 2022-07-20 RX ORDER — ALLOPURINOL 100 MG/1
TABLET ORAL
Qty: 90 TABLET | Refills: 3 | Status: SHIPPED | OUTPATIENT
Start: 2022-07-20

## 2022-07-20 RX ORDER — LEVOTHYROXINE SODIUM 50 MCG
TABLET ORAL
Qty: 90 TABLET | Refills: 3 | Status: SHIPPED | OUTPATIENT
Start: 2022-07-20

## 2022-07-21 RX ORDER — CANAGLIFLOZIN 100 MG/1
TABLET, FILM COATED ORAL
Qty: 90 TABLET | Refills: 0 | Status: SHIPPED | OUTPATIENT
Start: 2022-07-21

## 2022-07-27 DIAGNOSIS — E11.9 TYPE 2 DIABETES MELLITUS WITHOUT COMPLICATION, WITHOUT LONG-TERM CURRENT USE OF INSULIN (HCC): ICD-10-CM

## 2022-07-27 RX ORDER — DULAGLUTIDE 1.5 MG/.5ML
INJECTION, SOLUTION SUBCUTANEOUS
Qty: 6 ML | Refills: 1 | Status: SHIPPED | OUTPATIENT
Start: 2022-07-27

## 2022-08-01 ENCOUNTER — MED REC SCAN ONLY (OUTPATIENT)
Dept: INTERNAL MEDICINE CLINIC | Facility: CLINIC | Age: 77
End: 2022-08-01

## 2022-08-01 ENCOUNTER — TELEPHONE (OUTPATIENT)
Dept: INTERNAL MEDICINE CLINIC | Facility: CLINIC | Age: 77
End: 2022-08-01

## 2022-08-01 DIAGNOSIS — Z12.5 ENCOUNTER FOR SCREENING FOR MALIGNANT NEOPLASM OF PROSTATE: ICD-10-CM

## 2022-08-01 DIAGNOSIS — I10 ESSENTIAL HYPERTENSION: Primary | ICD-10-CM

## 2022-08-01 DIAGNOSIS — E55.9 VITAMIN D DEFICIENCY: ICD-10-CM

## 2022-08-01 DIAGNOSIS — E11.9 TYPE 2 DIABETES MELLITUS WITHOUT COMPLICATION, WITHOUT LONG-TERM CURRENT USE OF INSULIN (HCC): ICD-10-CM

## 2022-08-01 DIAGNOSIS — E78.5 DYSLIPIDEMIA: ICD-10-CM

## 2022-08-01 NOTE — TELEPHONE ENCOUNTER
Labs ordered-he should get this done fasting if possible   Please remind patient that he needs to see eye doctor if he is already seeing them to and to bring in their notes so that he can be updated to the chart

## 2022-08-01 NOTE — TELEPHONE ENCOUNTER
Patient called regarding labs. I let him know that labs were place and relayed providers instructions.

## 2022-08-02 ENCOUNTER — LAB ENCOUNTER (OUTPATIENT)
Dept: LAB | Facility: HOSPITAL | Age: 77
End: 2022-08-02
Attending: INTERNAL MEDICINE
Payer: MEDICARE

## 2022-08-02 DIAGNOSIS — E55.9 VITAMIN D DEFICIENCY: ICD-10-CM

## 2022-08-02 DIAGNOSIS — E11.9 TYPE 2 DIABETES MELLITUS WITHOUT COMPLICATION, WITHOUT LONG-TERM CURRENT USE OF INSULIN (HCC): ICD-10-CM

## 2022-08-02 DIAGNOSIS — I10 ESSENTIAL HYPERTENSION: ICD-10-CM

## 2022-08-02 DIAGNOSIS — E78.5 DYSLIPIDEMIA: ICD-10-CM

## 2022-08-02 DIAGNOSIS — Z12.5 ENCOUNTER FOR SCREENING FOR MALIGNANT NEOPLASM OF PROSTATE: ICD-10-CM

## 2022-08-02 LAB
ALBUMIN SERPL-MCNC: 3.8 G/DL (ref 3.4–5)
ALBUMIN/GLOB SERPL: 1.2 {RATIO} (ref 1–2)
ALP LIVER SERPL-CCNC: 36 U/L
ALT SERPL-CCNC: 29 U/L
ANION GAP SERPL CALC-SCNC: 5 MMOL/L (ref 0–18)
AST SERPL-CCNC: 23 U/L (ref 15–37)
BILIRUB SERPL-MCNC: 0.5 MG/DL (ref 0.1–2)
BUN BLD-MCNC: 18 MG/DL (ref 7–18)
BUN/CREAT SERPL: 14.9 (ref 10–20)
CALCIUM BLD-MCNC: 9.1 MG/DL (ref 8.5–10.1)
CHLORIDE SERPL-SCNC: 109 MMOL/L (ref 98–112)
CHOLEST SERPL-MCNC: 157 MG/DL (ref ?–200)
CO2 SERPL-SCNC: 27 MMOL/L (ref 21–32)
COMPLEXED PSA SERPL-MCNC: 0.49 NG/ML (ref ?–4)
CREAT BLD-MCNC: 1.21 MG/DL
DEPRECATED RDW RBC AUTO: 46.5 FL (ref 35.1–46.3)
ERYTHROCYTE [DISTWIDTH] IN BLOOD BY AUTOMATED COUNT: 13.5 % (ref 11–15)
EST. AVERAGE GLUCOSE BLD GHB EST-MCNC: 151 MG/DL (ref 68–126)
FASTING PATIENT LIPID ANSWER: YES
FASTING STATUS PATIENT QL REPORTED: YES
GFR SERPLBLD BASED ON 1.73 SQ M-ARVRAT: 62 ML/MIN/1.73M2 (ref 60–?)
GLOBULIN PLAS-MCNC: 3.2 G/DL (ref 2.8–4.4)
GLUCOSE BLD-MCNC: 105 MG/DL (ref 70–99)
HBA1C MFR BLD: 6.9 % (ref ?–5.7)
HCT VFR BLD AUTO: 47.1 %
HDLC SERPL-MCNC: 36 MG/DL (ref 40–59)
HGB BLD-MCNC: 14.9 G/DL
LDLC SERPL CALC-MCNC: 92 MG/DL (ref ?–100)
MCH RBC QN AUTO: 29.7 PG (ref 26–34)
MCHC RBC AUTO-ENTMCNC: 31.6 G/DL (ref 31–37)
MCV RBC AUTO: 93.8 FL
NONHDLC SERPL-MCNC: 121 MG/DL (ref ?–130)
OSMOLALITY SERPL CALC.SUM OF ELEC: 294 MOSM/KG (ref 275–295)
PLATELET # BLD AUTO: 247 10(3)UL (ref 150–450)
POTASSIUM SERPL-SCNC: 4.9 MMOL/L (ref 3.5–5.1)
PROT SERPL-MCNC: 7 G/DL (ref 6.4–8.2)
RBC # BLD AUTO: 5.02 X10(6)UL
SODIUM SERPL-SCNC: 141 MMOL/L (ref 136–145)
TRIGL SERPL-MCNC: 164 MG/DL (ref 30–149)
TSI SER-ACNC: 2.81 MIU/ML (ref 0.36–3.74)
VIT D+METAB SERPL-MCNC: 37.3 NG/ML (ref 30–100)
VLDLC SERPL CALC-MCNC: 27 MG/DL (ref 0–30)
WBC # BLD AUTO: 5.2 X10(3) UL (ref 4–11)

## 2022-08-02 PROCEDURE — 80061 LIPID PANEL: CPT

## 2022-08-02 PROCEDURE — 83036 HEMOGLOBIN GLYCOSYLATED A1C: CPT

## 2022-08-02 PROCEDURE — 84443 ASSAY THYROID STIM HORMONE: CPT

## 2022-08-02 PROCEDURE — 82306 VITAMIN D 25 HYDROXY: CPT

## 2022-08-02 PROCEDURE — 85027 COMPLETE CBC AUTOMATED: CPT

## 2022-08-02 PROCEDURE — 80053 COMPREHEN METABOLIC PANEL: CPT

## 2022-08-02 PROCEDURE — 36415 COLL VENOUS BLD VENIPUNCTURE: CPT

## 2022-08-03 ENCOUNTER — OFFICE VISIT (OUTPATIENT)
Dept: INTERNAL MEDICINE CLINIC | Facility: CLINIC | Age: 77
End: 2022-08-03
Payer: MEDICARE

## 2022-08-03 VITALS
SYSTOLIC BLOOD PRESSURE: 136 MMHG | WEIGHT: 285.19 LBS | BODY MASS INDEX: 39.93 KG/M2 | HEART RATE: 68 BPM | RESPIRATION RATE: 18 BRPM | DIASTOLIC BLOOD PRESSURE: 81 MMHG | HEIGHT: 71 IN

## 2022-08-03 DIAGNOSIS — E11.9 TYPE 2 DIABETES MELLITUS WITHOUT COMPLICATION, WITHOUT LONG-TERM CURRENT USE OF INSULIN (HCC): ICD-10-CM

## 2022-08-03 DIAGNOSIS — Z12.11 SCREENING FOR COLON CANCER: ICD-10-CM

## 2022-08-03 DIAGNOSIS — N18.30 STAGE 3 CHRONIC KIDNEY DISEASE, UNSPECIFIED WHETHER STAGE 3A OR 3B CKD (HCC): ICD-10-CM

## 2022-08-03 DIAGNOSIS — E11.22 TYPE 2 DIABETES MELLITUS WITH STAGE 3 CHRONIC KIDNEY DISEASE, WITHOUT LONG-TERM CURRENT USE OF INSULIN, UNSPECIFIED WHETHER STAGE 3A OR 3B CKD (HCC): ICD-10-CM

## 2022-08-03 DIAGNOSIS — N18.30 TYPE 2 DIABETES MELLITUS WITH STAGE 3 CHRONIC KIDNEY DISEASE, WITHOUT LONG-TERM CURRENT USE OF INSULIN, UNSPECIFIED WHETHER STAGE 3A OR 3B CKD (HCC): ICD-10-CM

## 2022-08-03 DIAGNOSIS — D69.2 SENILE PURPURA (HCC): ICD-10-CM

## 2022-08-03 DIAGNOSIS — E66.01 MORBID OBESITY (HCC): ICD-10-CM

## 2022-08-03 DIAGNOSIS — I70.0 AORTIC ATHEROSCLEROSIS (HCC): ICD-10-CM

## 2022-08-03 DIAGNOSIS — I10 ESSENTIAL HYPERTENSION: Primary | ICD-10-CM

## 2022-08-03 DIAGNOSIS — Z23 NEED FOR VACCINATION: ICD-10-CM

## 2022-08-03 PROCEDURE — 99214 OFFICE O/P EST MOD 30 MIN: CPT | Performed by: INTERNAL MEDICINE

## 2022-08-03 PROCEDURE — 90471 IMMUNIZATION ADMIN: CPT | Performed by: INTERNAL MEDICINE

## 2022-08-03 PROCEDURE — 1126F AMNT PAIN NOTED NONE PRSNT: CPT | Performed by: INTERNAL MEDICINE

## 2022-08-03 PROCEDURE — 90715 TDAP VACCINE 7 YRS/> IM: CPT | Performed by: INTERNAL MEDICINE

## 2022-08-04 ENCOUNTER — OFFICE VISIT (OUTPATIENT)
Dept: NEPHROLOGY | Facility: CLINIC | Age: 77
End: 2022-08-04
Payer: MEDICARE

## 2022-08-04 ENCOUNTER — OFFICE VISIT (OUTPATIENT)
Dept: ENDOCRINOLOGY CLINIC | Facility: CLINIC | Age: 77
End: 2022-08-04
Payer: MEDICARE

## 2022-08-04 VITALS
HEART RATE: 70 BPM | SYSTOLIC BLOOD PRESSURE: 142 MMHG | DIASTOLIC BLOOD PRESSURE: 82 MMHG | WEIGHT: 285 LBS | BODY MASS INDEX: 40 KG/M2

## 2022-08-04 VITALS
WEIGHT: 286 LBS | BODY MASS INDEX: 40.04 KG/M2 | HEART RATE: 74 BPM | HEIGHT: 71 IN | SYSTOLIC BLOOD PRESSURE: 138 MMHG | DIASTOLIC BLOOD PRESSURE: 74 MMHG

## 2022-08-04 DIAGNOSIS — N18.30 STAGE 3 CHRONIC KIDNEY DISEASE, UNSPECIFIED WHETHER STAGE 3A OR 3B CKD (HCC): Primary | ICD-10-CM

## 2022-08-04 DIAGNOSIS — E11.9 CONTROLLED TYPE 2 DIABETES MELLITUS WITHOUT COMPLICATION, WITHOUT LONG-TERM CURRENT USE OF INSULIN (HCC): Primary | ICD-10-CM

## 2022-08-04 DIAGNOSIS — E03.8 SUBCLINICAL HYPOTHYROIDISM: ICD-10-CM

## 2022-08-04 DIAGNOSIS — I10 ESSENTIAL HYPERTENSION: ICD-10-CM

## 2022-08-04 LAB
CARTRIDGE LOT#: ABNORMAL NUMERIC
GLUCOSE BLOOD: 69
HEMOGLOBIN A1C: 6.7 % (ref 4.3–5.6)
TEST STRIP LOT #: NORMAL NUMERIC

## 2022-08-04 PROCEDURE — 83036 HEMOGLOBIN GLYCOSYLATED A1C: CPT | Performed by: INTERNAL MEDICINE

## 2022-08-04 PROCEDURE — 99214 OFFICE O/P EST MOD 30 MIN: CPT | Performed by: INTERNAL MEDICINE

## 2022-08-04 PROCEDURE — 1126F AMNT PAIN NOTED NONE PRSNT: CPT | Performed by: INTERNAL MEDICINE

## 2022-08-04 PROCEDURE — 82947 ASSAY GLUCOSE BLOOD QUANT: CPT | Performed by: INTERNAL MEDICINE

## 2022-08-05 ENCOUNTER — TELEPHONE (OUTPATIENT)
Dept: PULMONOLOGY | Facility: CLINIC | Age: 77
End: 2022-08-05

## 2022-08-05 NOTE — TELEPHONE ENCOUNTER
Received physician's order for CPAP supplies from [facility]. Order placed in Dr. Henna Last folder for signature.

## 2022-08-16 ENCOUNTER — TELEPHONE (OUTPATIENT)
Dept: PULMONOLOGY | Facility: CLINIC | Age: 77
End: 2022-08-16

## 2022-10-10 RX ORDER — CANAGLIFLOZIN 100 MG/1
TABLET, FILM COATED ORAL
Qty: 90 TABLET | Refills: 0 | Status: SHIPPED | OUTPATIENT
Start: 2022-10-10

## 2022-10-10 NOTE — TELEPHONE ENCOUNTER
LOV: 8/4/22    RTC: 6 Months    FU: No FU Appt Scheduled    Last Refill: 7/21/22    3 Month Supply Pending         [employer] message sent to schedule fu in feb 2023.

## 2022-12-06 ENCOUNTER — IMMUNIZATION (OUTPATIENT)
Dept: LAB | Age: 77
End: 2022-12-06
Attending: EMERGENCY MEDICINE
Payer: MEDICARE

## 2022-12-06 DIAGNOSIS — Z23 NEED FOR VACCINATION: Primary | ICD-10-CM

## 2022-12-06 PROCEDURE — 0134A SARSCOV2 VAC BVL 50MCG/0.5ML: CPT

## 2022-12-22 RX ORDER — CANAGLIFLOZIN 100 MG/1
TABLET, FILM COATED ORAL
Qty: 90 TABLET | Refills: 0 | Status: SHIPPED | OUTPATIENT
Start: 2022-12-22

## 2023-01-30 ENCOUNTER — OFFICE VISIT (OUTPATIENT)
Dept: SURGERY | Facility: CLINIC | Age: 78
End: 2023-01-30

## 2023-01-30 VITALS — HEART RATE: 79 BPM | DIASTOLIC BLOOD PRESSURE: 80 MMHG | SYSTOLIC BLOOD PRESSURE: 171 MMHG

## 2023-01-30 DIAGNOSIS — N52.9 ERECTILE DYSFUNCTION, UNSPECIFIED ERECTILE DYSFUNCTION TYPE: Primary | ICD-10-CM

## 2023-01-30 PROCEDURE — 99204 OFFICE O/P NEW MOD 45 MIN: CPT | Performed by: UROLOGY

## 2023-01-30 RX ORDER — SILDENAFIL 100 MG/1
100 TABLET, FILM COATED ORAL
Qty: 6 TABLET | Refills: 3 | Status: SHIPPED | OUTPATIENT
Start: 2023-01-30

## 2023-02-01 ENCOUNTER — MED REC SCAN ONLY (OUTPATIENT)
Dept: INTERNAL MEDICINE CLINIC | Facility: CLINIC | Age: 78
End: 2023-02-01

## 2023-02-08 ENCOUNTER — PATIENT MESSAGE (OUTPATIENT)
Dept: ENDOCRINOLOGY CLINIC | Facility: CLINIC | Age: 78
End: 2023-02-08

## 2023-02-08 DIAGNOSIS — E11.9 CONTROLLED TYPE 2 DIABETES MELLITUS WITHOUT COMPLICATION, WITHOUT LONG-TERM CURRENT USE OF INSULIN (HCC): Primary | ICD-10-CM

## 2023-03-15 ENCOUNTER — LAB ENCOUNTER (OUTPATIENT)
Dept: LAB | Facility: HOSPITAL | Age: 78
End: 2023-03-15
Attending: INTERNAL MEDICINE
Payer: MEDICARE

## 2023-03-15 DIAGNOSIS — N18.30 TYPE 2 DIABETES MELLITUS WITH STAGE 3 CHRONIC KIDNEY DISEASE, WITHOUT LONG-TERM CURRENT USE OF INSULIN, UNSPECIFIED WHETHER STAGE 3A OR 3B CKD (HCC): ICD-10-CM

## 2023-03-15 DIAGNOSIS — E11.22 TYPE 2 DIABETES MELLITUS WITH STAGE 3 CHRONIC KIDNEY DISEASE, WITHOUT LONG-TERM CURRENT USE OF INSULIN, UNSPECIFIED WHETHER STAGE 3A OR 3B CKD (HCC): ICD-10-CM

## 2023-03-15 DIAGNOSIS — E11.9 CONTROLLED TYPE 2 DIABETES MELLITUS WITHOUT COMPLICATION, WITHOUT LONG-TERM CURRENT USE OF INSULIN (HCC): ICD-10-CM

## 2023-03-15 LAB
ALBUMIN SERPL-MCNC: 3.9 G/DL (ref 3.4–5)
ALBUMIN/GLOB SERPL: 1.1 {RATIO} (ref 1–2)
ALP LIVER SERPL-CCNC: 41 U/L
ALT SERPL-CCNC: 37 U/L
ANION GAP SERPL CALC-SCNC: 7 MMOL/L (ref 0–18)
AST SERPL-CCNC: 25 U/L (ref 15–37)
BILIRUB SERPL-MCNC: 0.4 MG/DL (ref 0.1–2)
BUN BLD-MCNC: 23 MG/DL (ref 7–18)
BUN/CREAT SERPL: 18.5 (ref 10–20)
CALCIUM BLD-MCNC: 10.2 MG/DL (ref 8.5–10.1)
CHLORIDE SERPL-SCNC: 112 MMOL/L (ref 98–112)
CHOLEST SERPL-MCNC: 143 MG/DL (ref ?–200)
CO2 SERPL-SCNC: 25 MMOL/L (ref 21–32)
CREAT BLD-MCNC: 1.24 MG/DL
CREAT UR-SCNC: 170 MG/DL
DEPRECATED RDW RBC AUTO: 44.2 FL (ref 35.1–46.3)
ERYTHROCYTE [DISTWIDTH] IN BLOOD BY AUTOMATED COUNT: 13.2 % (ref 11–15)
EST. AVERAGE GLUCOSE BLD GHB EST-MCNC: 151 MG/DL (ref 68–126)
FASTING PATIENT LIPID ANSWER: YES
FASTING STATUS PATIENT QL REPORTED: YES
GFR SERPLBLD BASED ON 1.73 SQ M-ARVRAT: 60 ML/MIN/1.73M2 (ref 60–?)
GLOBULIN PLAS-MCNC: 3.4 G/DL (ref 2.8–4.4)
GLUCOSE BLD-MCNC: 113 MG/DL (ref 70–99)
HBA1C MFR BLD: 6.9 % (ref ?–5.7)
HCT VFR BLD AUTO: 46.6 %
HDLC SERPL-MCNC: 36 MG/DL (ref 40–59)
HGB BLD-MCNC: 15.1 G/DL
LDLC SERPL CALC-MCNC: 73 MG/DL (ref ?–100)
MCH RBC QN AUTO: 29.8 PG (ref 26–34)
MCHC RBC AUTO-ENTMCNC: 32.4 G/DL (ref 31–37)
MCV RBC AUTO: 91.9 FL
MICROALBUMIN UR-MCNC: 2.79 MG/DL
MICROALBUMIN/CREAT 24H UR-RTO: 16.4 UG/MG (ref ?–30)
NONHDLC SERPL-MCNC: 107 MG/DL (ref ?–130)
OSMOLALITY SERPL CALC.SUM OF ELEC: 302 MOSM/KG (ref 275–295)
PLATELET # BLD AUTO: 263 10(3)UL (ref 150–450)
POTASSIUM SERPL-SCNC: 4.5 MMOL/L (ref 3.5–5.1)
PROT SERPL-MCNC: 7.3 G/DL (ref 6.4–8.2)
RBC # BLD AUTO: 5.07 X10(6)UL
SODIUM SERPL-SCNC: 144 MMOL/L (ref 136–145)
TRIGL SERPL-MCNC: 203 MG/DL (ref 30–149)
TSI SER-ACNC: 3.54 MIU/ML (ref 0.36–3.74)
VLDLC SERPL CALC-MCNC: 31 MG/DL (ref 0–30)
WBC # BLD AUTO: 7.1 X10(3) UL (ref 4–11)

## 2023-03-15 PROCEDURE — 36415 COLL VENOUS BLD VENIPUNCTURE: CPT

## 2023-03-15 PROCEDURE — 83036 HEMOGLOBIN GLYCOSYLATED A1C: CPT

## 2023-03-15 PROCEDURE — 80061 LIPID PANEL: CPT

## 2023-03-15 PROCEDURE — 82043 UR ALBUMIN QUANTITATIVE: CPT

## 2023-03-15 PROCEDURE — 82570 ASSAY OF URINE CREATININE: CPT

## 2023-03-15 PROCEDURE — 80053 COMPREHEN METABOLIC PANEL: CPT

## 2023-03-15 PROCEDURE — 85027 COMPLETE CBC AUTOMATED: CPT

## 2023-03-15 PROCEDURE — 84443 ASSAY THYROID STIM HORMONE: CPT

## 2023-03-20 ENCOUNTER — OFFICE VISIT (OUTPATIENT)
Dept: SURGERY | Facility: CLINIC | Age: 78
End: 2023-03-20

## 2023-03-20 ENCOUNTER — OFFICE VISIT (OUTPATIENT)
Dept: INTERNAL MEDICINE CLINIC | Facility: CLINIC | Age: 78
End: 2023-03-20
Payer: MEDICARE

## 2023-03-20 VITALS
BODY MASS INDEX: 39.09 KG/M2 | WEIGHT: 279.19 LBS | SYSTOLIC BLOOD PRESSURE: 126 MMHG | HEIGHT: 71 IN | DIASTOLIC BLOOD PRESSURE: 62 MMHG | RESPIRATION RATE: 18 BRPM | HEART RATE: 69 BPM | OXYGEN SATURATION: 97 %

## 2023-03-20 DIAGNOSIS — E79.0 HYPERURICEMIA: ICD-10-CM

## 2023-03-20 DIAGNOSIS — Z00.00 ENCOUNTER FOR ANNUAL HEALTH EXAMINATION: Primary | ICD-10-CM

## 2023-03-20 DIAGNOSIS — E11.9 TYPE 2 DIABETES MELLITUS WITHOUT COMPLICATION, WITHOUT LONG-TERM CURRENT USE OF INSULIN (HCC): ICD-10-CM

## 2023-03-20 DIAGNOSIS — D69.2 SENILE PURPURA (HCC): ICD-10-CM

## 2023-03-20 DIAGNOSIS — I10 ESSENTIAL HYPERTENSION: ICD-10-CM

## 2023-03-20 DIAGNOSIS — M15.9 PRIMARY OSTEOARTHRITIS INVOLVING MULTIPLE JOINTS: ICD-10-CM

## 2023-03-20 DIAGNOSIS — N52.9 ERECTILE DYSFUNCTION, UNSPECIFIED ERECTILE DYSFUNCTION TYPE: Primary | ICD-10-CM

## 2023-03-20 DIAGNOSIS — N18.30 STAGE 3 CHRONIC KIDNEY DISEASE, UNSPECIFIED WHETHER STAGE 3A OR 3B CKD (HCC): ICD-10-CM

## 2023-03-20 DIAGNOSIS — Z86.73 HISTORY OF CVA (CEREBROVASCULAR ACCIDENT): ICD-10-CM

## 2023-03-20 DIAGNOSIS — E66.01 MORBID OBESITY (HCC): ICD-10-CM

## 2023-03-20 DIAGNOSIS — E03.9 HYPOTHYROIDISM, UNSPECIFIED TYPE: ICD-10-CM

## 2023-03-20 DIAGNOSIS — E78.5 HYPERLIPIDEMIA LDL GOAL <100: ICD-10-CM

## 2023-03-20 DIAGNOSIS — N18.30 TYPE 2 DIABETES MELLITUS WITH STAGE 3 CHRONIC KIDNEY DISEASE, WITHOUT LONG-TERM CURRENT USE OF INSULIN, UNSPECIFIED WHETHER STAGE 3A OR 3B CKD (HCC): ICD-10-CM

## 2023-03-20 DIAGNOSIS — G47.33 OBSTRUCTIVE SLEEP APNEA: ICD-10-CM

## 2023-03-20 DIAGNOSIS — E11.22 TYPE 2 DIABETES MELLITUS WITH STAGE 3 CHRONIC KIDNEY DISEASE, WITHOUT LONG-TERM CURRENT USE OF INSULIN, UNSPECIFIED WHETHER STAGE 3A OR 3B CKD (HCC): ICD-10-CM

## 2023-03-20 DIAGNOSIS — I70.0 AORTIC ATHEROSCLEROSIS (HCC): ICD-10-CM

## 2023-03-20 PROBLEM — M47.819 ARTHRITIS OF LOW BACK: Status: RESOLVED | Noted: 2021-03-22 | Resolved: 2023-03-20

## 2023-03-20 PROCEDURE — 99213 OFFICE O/P EST LOW 20 MIN: CPT | Performed by: UROLOGY

## 2023-03-20 RX ORDER — PRAVASTATIN SODIUM 40 MG
40 TABLET ORAL NIGHTLY
Qty: 90 TABLET | Refills: 3 | Status: SHIPPED | OUTPATIENT
Start: 2023-03-20

## 2023-03-20 RX ORDER — FENOFIBRATE 160 MG/1
160 TABLET ORAL DAILY
Qty: 90 TABLET | Refills: 3 | Status: SHIPPED | OUTPATIENT
Start: 2023-03-20

## 2023-03-20 RX ORDER — LOSARTAN POTASSIUM 100 MG/1
100 TABLET ORAL DAILY
Qty: 90 TABLET | Refills: 3 | Status: SHIPPED | OUTPATIENT
Start: 2023-03-20

## 2023-03-20 RX ORDER — DULAGLUTIDE 1.5 MG/.5ML
0.5 INJECTION, SOLUTION SUBCUTANEOUS
Qty: 6 ML | Refills: 1 | Status: CANCELLED | OUTPATIENT
Start: 2023-03-20

## 2023-03-20 RX ORDER — GLIPIZIDE 10 MG/1
10 TABLET, FILM COATED, EXTENDED RELEASE ORAL 2 TIMES DAILY
Qty: 180 TABLET | Refills: 3 | Status: SHIPPED | OUTPATIENT
Start: 2023-03-20

## 2023-03-20 RX ORDER — LEVOTHYROXINE SODIUM 0.05 MG/1
50 TABLET ORAL
Qty: 90 TABLET | Refills: 3 | Status: SHIPPED | OUTPATIENT
Start: 2023-03-20

## 2023-03-20 RX ORDER — ALLOPURINOL 100 MG/1
100 TABLET ORAL DAILY
Qty: 90 TABLET | Refills: 3 | Status: SHIPPED | OUTPATIENT
Start: 2023-03-20

## 2023-03-20 RX ORDER — SILDENAFIL 100 MG/1
100 TABLET, FILM COATED ORAL
Qty: 6 TABLET | Refills: 3 | Status: SHIPPED | OUTPATIENT
Start: 2023-03-20

## 2023-03-20 RX ORDER — CARVEDILOL 3.12 MG/1
3.12 TABLET ORAL 2 TIMES DAILY WITH MEALS
Qty: 180 TABLET | Refills: 3 | Status: SHIPPED | OUTPATIENT
Start: 2023-03-20

## 2023-03-22 ENCOUNTER — OFFICE VISIT (OUTPATIENT)
Dept: NEPHROLOGY | Facility: CLINIC | Age: 78
End: 2023-03-22

## 2023-03-22 VITALS
SYSTOLIC BLOOD PRESSURE: 136 MMHG | BODY MASS INDEX: 39.2 KG/M2 | WEIGHT: 280 LBS | HEIGHT: 71 IN | HEART RATE: 80 BPM | DIASTOLIC BLOOD PRESSURE: 79 MMHG

## 2023-03-22 DIAGNOSIS — N18.30 STAGE 3 CHRONIC KIDNEY DISEASE, UNSPECIFIED WHETHER STAGE 3A OR 3B CKD (HCC): Primary | ICD-10-CM

## 2023-03-22 DIAGNOSIS — E83.52 HYPERCALCEMIA: ICD-10-CM

## 2023-03-22 PROCEDURE — 99214 OFFICE O/P EST MOD 30 MIN: CPT | Performed by: INTERNAL MEDICINE

## 2023-03-23 ENCOUNTER — OFFICE VISIT (OUTPATIENT)
Dept: ENDOCRINOLOGY CLINIC | Facility: CLINIC | Age: 78
End: 2023-03-23

## 2023-03-23 VITALS — SYSTOLIC BLOOD PRESSURE: 139 MMHG | DIASTOLIC BLOOD PRESSURE: 78 MMHG | HEART RATE: 69 BPM

## 2023-03-23 DIAGNOSIS — E11.9 TYPE 2 DIABETES MELLITUS WITHOUT COMPLICATION, WITHOUT LONG-TERM CURRENT USE OF INSULIN (HCC): ICD-10-CM

## 2023-03-23 DIAGNOSIS — E03.9 HYPOTHYROIDISM, UNSPECIFIED TYPE: ICD-10-CM

## 2023-03-23 DIAGNOSIS — E11.8 CONTROLLED TYPE 2 DIABETES MELLITUS WITH COMPLICATION, WITHOUT LONG-TERM CURRENT USE OF INSULIN (HCC): Primary | ICD-10-CM

## 2023-03-23 DIAGNOSIS — E83.52 HYPERCALCEMIA: ICD-10-CM

## 2023-03-23 PROCEDURE — 1126F AMNT PAIN NOTED NONE PRSNT: CPT | Performed by: INTERNAL MEDICINE

## 2023-03-23 PROCEDURE — 99214 OFFICE O/P EST MOD 30 MIN: CPT | Performed by: INTERNAL MEDICINE

## 2023-03-23 RX ORDER — DULAGLUTIDE 1.5 MG/.5ML
0.5 INJECTION, SOLUTION SUBCUTANEOUS
Qty: 6 ML | Refills: 1 | Status: SHIPPED | OUTPATIENT
Start: 2023-03-23

## 2023-03-23 RX ORDER — GLIPIZIDE 10 MG/1
10 TABLET, FILM COATED, EXTENDED RELEASE ORAL 2 TIMES DAILY
Qty: 180 TABLET | Refills: 3 | Status: SHIPPED | OUTPATIENT
Start: 2023-03-23

## 2023-03-23 RX ORDER — LEVOTHYROXINE SODIUM 0.05 MG/1
50 TABLET ORAL
Qty: 90 TABLET | Refills: 3 | Status: SHIPPED | OUTPATIENT
Start: 2023-03-23

## 2023-03-23 RX ORDER — CANAGLIFLOZIN 100 MG/1
100 TABLET, FILM COATED ORAL DAILY
Qty: 90 TABLET | Refills: 1 | Status: SHIPPED | OUTPATIENT
Start: 2023-03-23

## 2023-04-20 ENCOUNTER — LAB ENCOUNTER (OUTPATIENT)
Dept: LAB | Facility: HOSPITAL | Age: 78
End: 2023-04-20
Attending: INTERNAL MEDICINE
Payer: MEDICARE

## 2023-04-20 ENCOUNTER — PATIENT MESSAGE (OUTPATIENT)
Dept: ENDOCRINOLOGY CLINIC | Facility: CLINIC | Age: 78
End: 2023-04-20

## 2023-04-20 DIAGNOSIS — E83.52 HYPERCALCEMIA: ICD-10-CM

## 2023-04-20 LAB
ANION GAP SERPL CALC-SCNC: 9 MMOL/L (ref 0–18)
BUN BLD-MCNC: 26 MG/DL (ref 7–18)
BUN/CREAT SERPL: 20.6 (ref 10–20)
CALCIUM BLD-MCNC: 10.1 MG/DL (ref 8.5–10.1)
CHLORIDE SERPL-SCNC: 110 MMOL/L (ref 98–112)
CO2 SERPL-SCNC: 24 MMOL/L (ref 21–32)
CREAT BLD-MCNC: 1.26 MG/DL
FASTING STATUS PATIENT QL REPORTED: YES
GFR SERPLBLD BASED ON 1.73 SQ M-ARVRAT: 59 ML/MIN/1.73M2 (ref 60–?)
GLUCOSE BLD-MCNC: 121 MG/DL (ref 70–99)
OSMOLALITY SERPL CALC.SUM OF ELEC: 302 MOSM/KG (ref 275–295)
POTASSIUM SERPL-SCNC: 4.6 MMOL/L (ref 3.5–5.1)
PTH-INTACT SERPL-MCNC: 15.4 PG/ML (ref 18.5–88)
SODIUM SERPL-SCNC: 143 MMOL/L (ref 136–145)

## 2023-04-20 PROCEDURE — 36415 COLL VENOUS BLD VENIPUNCTURE: CPT

## 2023-04-20 PROCEDURE — 83970 ASSAY OF PARATHORMONE: CPT

## 2023-04-20 PROCEDURE — 80048 BASIC METABOLIC PNL TOTAL CA: CPT

## 2023-08-02 ENCOUNTER — PATIENT MESSAGE (OUTPATIENT)
Dept: ENDOCRINOLOGY CLINIC | Facility: CLINIC | Age: 78
End: 2023-08-02

## 2023-08-02 ENCOUNTER — PATIENT MESSAGE (OUTPATIENT)
Dept: NEPHROLOGY | Facility: CLINIC | Age: 78
End: 2023-08-02

## 2023-08-02 ENCOUNTER — PATIENT MESSAGE (OUTPATIENT)
Dept: INTERNAL MEDICINE CLINIC | Facility: CLINIC | Age: 78
End: 2023-08-02

## 2023-08-02 DIAGNOSIS — E79.0 HYPERURICEMIA: ICD-10-CM

## 2023-08-02 DIAGNOSIS — N18.30 TYPE 2 DIABETES MELLITUS WITH STAGE 3 CHRONIC KIDNEY DISEASE, WITHOUT LONG-TERM CURRENT USE OF INSULIN, UNSPECIFIED WHETHER STAGE 3A OR 3B CKD (HCC): ICD-10-CM

## 2023-08-02 DIAGNOSIS — E11.22 TYPE 2 DIABETES MELLITUS WITH STAGE 3 CHRONIC KIDNEY DISEASE, WITHOUT LONG-TERM CURRENT USE OF INSULIN, UNSPECIFIED WHETHER STAGE 3A OR 3B CKD (HCC): ICD-10-CM

## 2023-08-02 DIAGNOSIS — E03.9 HYPOTHYROIDISM, UNSPECIFIED TYPE: Primary | ICD-10-CM

## 2023-08-02 DIAGNOSIS — E11.8 CONTROLLED TYPE 2 DIABETES MELLITUS WITH COMPLICATION, WITHOUT LONG-TERM CURRENT USE OF INSULIN (HCC): ICD-10-CM

## 2023-08-02 DIAGNOSIS — I10 ESSENTIAL HYPERTENSION: Primary | ICD-10-CM

## 2023-08-03 NOTE — TELEPHONE ENCOUNTER
From: Dheeraj Coats  To: Megan Rojas MD  Sent: 8/2/2023 9:12 PM CDT  Subject: Tests     I have my appointment on October 12 could Dr Bazan Oh put in request for tests she wants done. I plan on going to lab prior to appointment.   Thanks   Alphonso Yan

## 2023-08-03 NOTE — TELEPHONE ENCOUNTER
I see dr. Aracely Caban has ordered labs and will be seeing patient in October - do those test and will be good

## 2023-08-03 NOTE — TELEPHONE ENCOUNTER
From: Kaia Burk  To: Yumiko Jones MD  Sent: 8/2/2023 9:14 PM CDT  Subject: Tests     I have my appointment on October 16 could Dr Ericka Parekh. put in request for tests she wants done. I plan on going to lab prior to appointment.   Thanks   Danica Jacobson

## 2023-08-04 NOTE — TELEPHONE ENCOUNTER
From: Mahesh Carrillo  To: Ishmael Leija MD  Sent: 8/2/2023 9:11 PM CDT  Subject: Tests    I have my appointment on October 12 could Dr Kellen Simpson put in request for tests she wants done. I plan on going to lab prior to appointment.   Thanks   Dre Flores

## 2023-08-04 NOTE — TELEPHONE ENCOUNTER
Patient requesting lab orders to be placed to have completed prior to upcoming appointment.     Future Appointments   Date Time Provider Christian Chowdhury   10/12/2023  9:20 AM Miguel Damon MD [facility]   10/12/2023  1:20 PM Tim Marc MD [id number] [facility]   10/16/2023 11:00 AM Charolett Simmonds, MD ECWMOENDO [facility]

## 2023-08-21 RX ORDER — CANAGLIFLOZIN 100 MG/1
100 TABLET, FILM COATED ORAL DAILY
Qty: 90 TABLET | Refills: 1 | Status: SHIPPED | OUTPATIENT
Start: 2023-08-21

## 2023-09-06 ENCOUNTER — TELEPHONE (OUTPATIENT)
Dept: INTERNAL MEDICINE CLINIC | Facility: CLINIC | Age: 78
End: 2023-09-06

## 2023-09-06 NOTE — TELEPHONE ENCOUNTER
Per wife of patient, patient was in [address] [place] [address] in [place] due to a cellulitis, they gave patient an antibiotic but patient needs to see doctor for a follow up. No available appointment except \"res24\" on 09/13/2023 can we offer those to patient, Please advise, Thank you.     Please reply to pool: [facility]

## 2023-09-07 ENCOUNTER — OFFICE VISIT (OUTPATIENT)
Dept: INTERNAL MEDICINE CLINIC | Facility: CLINIC | Age: 78
End: 2023-09-07

## 2023-09-07 VITALS
SYSTOLIC BLOOD PRESSURE: 132 MMHG | OXYGEN SATURATION: 96 % | BODY MASS INDEX: 37.8 KG/M2 | HEART RATE: 70 BPM | DIASTOLIC BLOOD PRESSURE: 68 MMHG | HEIGHT: 71 IN | WEIGHT: 270 LBS

## 2023-09-07 DIAGNOSIS — L03.115 CELLULITIS OF RIGHT LOWER EXTREMITY: Primary | ICD-10-CM

## 2023-09-07 PROCEDURE — 99213 OFFICE O/P EST LOW 20 MIN: CPT

## 2023-09-07 PROCEDURE — 1126F AMNT PAIN NOTED NONE PRSNT: CPT

## 2023-09-07 RX ORDER — CLINDAMYCIN HYDROCHLORIDE 300 MG/1
300 CAPSULE ORAL 3 TIMES DAILY
COMMUNITY
Start: 2023-08-23

## 2023-09-07 RX ORDER — B-COMPLEX WITH VITAMIN C
1 TABLET ORAL DAILY
COMMUNITY

## 2023-10-02 DIAGNOSIS — E11.9 TYPE 2 DIABETES MELLITUS WITHOUT COMPLICATION, WITHOUT LONG-TERM CURRENT USE OF INSULIN (HCC): ICD-10-CM

## 2023-10-02 RX ORDER — DULAGLUTIDE 1.5 MG/.5ML
0.5 INJECTION, SOLUTION SUBCUTANEOUS
Qty: 6 ML | Refills: 1 | Status: SHIPPED | OUTPATIENT
Start: 2023-10-02

## 2023-10-06 RX ORDER — SILDENAFIL 100 MG/1
100 TABLET, FILM COATED ORAL
Qty: 6 TABLET | Refills: 3 | Status: SHIPPED | OUTPATIENT
Start: 2023-10-06

## 2023-10-09 ENCOUNTER — TELEPHONE (OUTPATIENT)
Dept: PULMONOLOGY | Facility: CLINIC | Age: 78
End: 2023-10-09

## 2023-10-09 ENCOUNTER — LAB ENCOUNTER (OUTPATIENT)
Dept: LAB | Facility: HOSPITAL | Age: 78
End: 2023-10-09
Attending: INTERNAL MEDICINE
Payer: MEDICARE

## 2023-10-09 DIAGNOSIS — E11.8 CONTROLLED TYPE 2 DIABETES MELLITUS WITH COMPLICATION, WITHOUT LONG-TERM CURRENT USE OF INSULIN (HCC): ICD-10-CM

## 2023-10-09 DIAGNOSIS — I10 ESSENTIAL HYPERTENSION: ICD-10-CM

## 2023-10-09 DIAGNOSIS — N18.30 STAGE 3 CHRONIC KIDNEY DISEASE, UNSPECIFIED WHETHER STAGE 3A OR 3B CKD (HCC): ICD-10-CM

## 2023-10-09 DIAGNOSIS — E79.0 HYPERURICEMIA: ICD-10-CM

## 2023-10-09 DIAGNOSIS — N18.30 TYPE 2 DIABETES MELLITUS WITH STAGE 3 CHRONIC KIDNEY DISEASE, WITHOUT LONG-TERM CURRENT USE OF INSULIN, UNSPECIFIED WHETHER STAGE 3A OR 3B CKD (HCC): ICD-10-CM

## 2023-10-09 DIAGNOSIS — E11.22 TYPE 2 DIABETES MELLITUS WITH STAGE 3 CHRONIC KIDNEY DISEASE, WITHOUT LONG-TERM CURRENT USE OF INSULIN, UNSPECIFIED WHETHER STAGE 3A OR 3B CKD (HCC): ICD-10-CM

## 2023-10-09 DIAGNOSIS — E03.9 HYPOTHYROIDISM, UNSPECIFIED TYPE: ICD-10-CM

## 2023-10-09 LAB
ALBUMIN SERPL-MCNC: 3.8 G/DL (ref 3.4–5)
ALBUMIN/GLOB SERPL: 1.1 {RATIO} (ref 1–2)
ALP LIVER SERPL-CCNC: 37 U/L
ALT SERPL-CCNC: 31 U/L
ANION GAP SERPL CALC-SCNC: 6 MMOL/L (ref 0–18)
AST SERPL-CCNC: 20 U/L (ref 15–37)
BILIRUB SERPL-MCNC: 0.6 MG/DL (ref 0.1–2)
BILIRUB UR QL: NEGATIVE
BUN BLD-MCNC: 19 MG/DL (ref 7–18)
BUN/CREAT SERPL: 16.4 (ref 10–20)
CALCIUM BLD-MCNC: 9 MG/DL (ref 8.5–10.1)
CHLORIDE SERPL-SCNC: 111 MMOL/L (ref 98–112)
CHOLEST SERPL-MCNC: 153 MG/DL (ref ?–200)
CLARITY UR: CLEAR
CO2 SERPL-SCNC: 25 MMOL/L (ref 21–32)
COLOR UR: YELLOW
CREAT BLD-MCNC: 1.16 MG/DL
CREAT UR-SCNC: 128 MG/DL
DEPRECATED RDW RBC AUTO: 45.1 FL (ref 35.1–46.3)
EGFRCR SERPLBLD CKD-EPI 2021: 65 ML/MIN/1.73M2 (ref 60–?)
ERYTHROCYTE [DISTWIDTH] IN BLOOD BY AUTOMATED COUNT: 13.6 % (ref 11–15)
EST. AVERAGE GLUCOSE BLD GHB EST-MCNC: 140 MG/DL (ref 68–126)
FASTING PATIENT LIPID ANSWER: YES
FASTING STATUS PATIENT QL REPORTED: YES
GLOBULIN PLAS-MCNC: 3.5 G/DL (ref 2.8–4.4)
GLUCOSE BLD-MCNC: 98 MG/DL (ref 70–99)
GLUCOSE UR-MCNC: >1000 MG/DL
HBA1C MFR BLD: 6.5 % (ref ?–5.7)
HCT VFR BLD AUTO: 45.6 %
HDLC SERPL-MCNC: 41 MG/DL (ref 40–59)
HGB BLD-MCNC: 14.9 G/DL
HGB UR QL STRIP.AUTO: NEGATIVE
KETONES UR-MCNC: NEGATIVE MG/DL
LDLC SERPL CALC-MCNC: 91 MG/DL (ref ?–100)
LEUKOCYTE ESTERASE UR QL STRIP.AUTO: NEGATIVE
MCH RBC QN AUTO: 29.5 PG (ref 26–34)
MCHC RBC AUTO-ENTMCNC: 32.7 G/DL (ref 31–37)
MCV RBC AUTO: 90.3 FL
MICROALBUMIN UR-MCNC: 1.51 MG/DL
MICROALBUMIN/CREAT 24H UR-RTO: 11.8 UG/MG (ref ?–30)
NITRITE UR QL STRIP.AUTO: NEGATIVE
NONHDLC SERPL-MCNC: 112 MG/DL (ref ?–130)
OSMOLALITY SERPL CALC.SUM OF ELEC: 296 MOSM/KG (ref 275–295)
PH UR: 5 [PH] (ref 5–8)
PLATELET # BLD AUTO: 216 10(3)UL (ref 150–450)
POTASSIUM SERPL-SCNC: 4.6 MMOL/L (ref 3.5–5.1)
PROT SERPL-MCNC: 7.3 G/DL (ref 6.4–8.2)
PROT UR-MCNC: NEGATIVE MG/DL
RBC # BLD AUTO: 5.05 X10(6)UL
SODIUM SERPL-SCNC: 142 MMOL/L (ref 136–145)
SP GR UR STRIP: 1.03 (ref 1–1.03)
T4 FREE SERPL-MCNC: 1.1 NG/DL (ref 0.8–1.7)
TRIGL SERPL-MCNC: 116 MG/DL (ref 30–149)
TSI SER-ACNC: 2.7 MIU/ML (ref 0.36–3.74)
URATE SERPL-MCNC: 3.5 MG/DL
UROBILINOGEN UR STRIP-ACNC: NORMAL
VLDLC SERPL CALC-MCNC: 19 MG/DL (ref 0–30)
WBC # BLD AUTO: 6 X10(3) UL (ref 4–11)

## 2023-10-09 PROCEDURE — 84439 ASSAY OF FREE THYROXINE: CPT

## 2023-10-09 PROCEDURE — 82570 ASSAY OF URINE CREATININE: CPT

## 2023-10-09 PROCEDURE — 84550 ASSAY OF BLOOD/URIC ACID: CPT

## 2023-10-09 PROCEDURE — 82043 UR ALBUMIN QUANTITATIVE: CPT

## 2023-10-09 PROCEDURE — 80061 LIPID PANEL: CPT

## 2023-10-09 PROCEDURE — 83036 HEMOGLOBIN GLYCOSYLATED A1C: CPT

## 2023-10-09 PROCEDURE — 80053 COMPREHEN METABOLIC PANEL: CPT

## 2023-10-09 PROCEDURE — 36415 COLL VENOUS BLD VENIPUNCTURE: CPT

## 2023-10-09 PROCEDURE — 84443 ASSAY THYROID STIM HORMONE: CPT

## 2023-10-09 PROCEDURE — 81003 URINALYSIS AUTO W/O SCOPE: CPT

## 2023-10-09 PROCEDURE — 85027 COMPLETE CBC AUTOMATED: CPT

## 2023-10-09 NOTE — TELEPHONE ENCOUNTER
RECD FAX FROM [facility] NEED ADDITIONAL INFO. SEE MEDIA FOR MORE INFO.   Received physician's order for CPAP supplies from [facility]. Order placed in Dr. Marmolejo's folder for signature. [employer] message sent to patient advising appointment will be needed for further orders as LOV was 2/22/22

## 2023-10-12 ENCOUNTER — OFFICE VISIT (OUTPATIENT)
Dept: INTERNAL MEDICINE CLINIC | Facility: CLINIC | Age: 78
End: 2023-10-12

## 2023-10-12 VITALS
HEIGHT: 71 IN | WEIGHT: 276.81 LBS | HEART RATE: 74 BPM | SYSTOLIC BLOOD PRESSURE: 126 MMHG | BODY MASS INDEX: 38.75 KG/M2 | DIASTOLIC BLOOD PRESSURE: 74 MMHG

## 2023-10-12 DIAGNOSIS — M15.9 PRIMARY OSTEOARTHRITIS INVOLVING MULTIPLE JOINTS: ICD-10-CM

## 2023-10-12 DIAGNOSIS — I10 ESSENTIAL HYPERTENSION: ICD-10-CM

## 2023-10-12 DIAGNOSIS — E11.22 TYPE 2 DIABETES MELLITUS WITH STAGE 3 CHRONIC KIDNEY DISEASE, WITHOUT LONG-TERM CURRENT USE OF INSULIN, UNSPECIFIED WHETHER STAGE 3A OR 3B CKD (HCC): ICD-10-CM

## 2023-10-12 DIAGNOSIS — N18.30 TYPE 2 DIABETES MELLITUS WITH STAGE 3 CHRONIC KIDNEY DISEASE, WITHOUT LONG-TERM CURRENT USE OF INSULIN, UNSPECIFIED WHETHER STAGE 3A OR 3B CKD (HCC): ICD-10-CM

## 2023-10-12 DIAGNOSIS — L03.115 CELLULITIS OF RIGHT LOWER EXTREMITY: Primary | ICD-10-CM

## 2023-10-12 PROCEDURE — 1125F AMNT PAIN NOTED PAIN PRSNT: CPT | Performed by: INTERNAL MEDICINE

## 2023-10-12 PROCEDURE — 99214 OFFICE O/P EST MOD 30 MIN: CPT | Performed by: INTERNAL MEDICINE

## 2023-10-12 RX ORDER — SULFAMETHOXAZOLE AND TRIMETHOPRIM 800; 160 MG/1; MG/1
1 TABLET ORAL 2 TIMES DAILY
Qty: 14 TABLET | Refills: 0 | Status: SHIPPED | OUTPATIENT
Start: 2023-10-12

## 2023-10-12 NOTE — TELEPHONE ENCOUNTER
Order signed by Dr. Marmolejo and returned to [facility] via fax# [phone] as requested. Confirmation received, original document sent for scanning.

## 2023-10-13 ENCOUNTER — OFFICE VISIT (OUTPATIENT)
Dept: NEPHROLOGY | Facility: CLINIC | Age: 78
End: 2023-10-13

## 2023-10-13 VITALS
HEIGHT: 71 IN | BODY MASS INDEX: 38.64 KG/M2 | SYSTOLIC BLOOD PRESSURE: 110 MMHG | WEIGHT: 276 LBS | DIASTOLIC BLOOD PRESSURE: 64 MMHG | HEART RATE: 76 BPM

## 2023-10-13 DIAGNOSIS — N18.30 STAGE 3 CHRONIC KIDNEY DISEASE, UNSPECIFIED WHETHER STAGE 3A OR 3B CKD (HCC): Primary | ICD-10-CM

## 2023-10-13 DIAGNOSIS — I10 ESSENTIAL HYPERTENSION: ICD-10-CM

## 2023-10-13 PROCEDURE — 99214 OFFICE O/P EST MOD 30 MIN: CPT | Performed by: INTERNAL MEDICINE

## 2023-10-13 RX ORDER — FUROSEMIDE 20 MG/1
20 TABLET ORAL
Qty: 10 TABLET | Refills: 0 | Status: SHIPPED | OUTPATIENT
Start: 2023-10-13

## 2023-10-13 NOTE — PROGRESS NOTES
Progress Note     Tara Marroquin is a 69 yrs old male with pmh of DM x 20 yrs, HTN X 20 yrs, CKD stage II, sleep apnea, morbidly obese on CPAP, right hip replacement, CVA who presented today for follow up     Patient baseline creatinine is around 1.0-1.1 till 2013, his creatinine was 1.4 mg/dl with an eGFR 48 ml/min in 2015. His BUN/Cr from 3/1/16 -> 43/1.90 mg/dl with an eGFR 35 ml/min and now improved to his baseline of 1.1 mg/dl. His UA is 2015 was negative for proteinuria and microscopic hematuria and UACR has been negative. His Aic has been in low 7s for last 2 years. Was taking OTC Aleve 3-4 times a week, denies any herbal supplement. - off of Aleve. Denies any personal and family history of nephrolithiasis. No FH of kidney disease or on dialysis. Had MRI brain done which showed Remote bilateral thalamic lacunar infarcts. Remote left cerebellar.hemisphere lacunar infarct. Home BP in 130/80s range - no leg swelling.  No urinary complaints     HISTORY:  Past Medical History:   Diagnosis Date    Acute, but ill-defined, cerebrovascular disease     Aortic atherosclerosis (HCC)     CT scan 4-14    Arthritis     ASD (atrial septal defect)     WILD 9-16    Chronic kidney disease, stage II (mild)     Diabetes (HCC)     Diabetes mellitus ([address]     Dyslipidemia     Essential hypertension     Hearing loss     History of CVA (cerebrovascular accident)     Multiple, asymptomatic    Hyperlipidemia     Hyperuricemia     Hypothyroidism     Morbid obesity ([address]     Obesity     Obstructive sleep apnea     On nightly CPAP    Osteoarthritis     Status post right KANCHAN January 2013    Sleep apnea     Type 2 diabetes mellitus ([address]       Past Surgical History:   Procedure Laterality Date    HERNIA SURGERY Right     HIP REPLACEMENT SURGERY Right     INGUINAL HERNIA REPAIR Right 1979    LEG/ANKLE SURGERY PROC UNLISTED Left 1980's    Bone chips removed from ankle    OTHER SURGICAL HISTORY      Ankle    SKIN SURGERY TOTAL HIP REPLACEMENT Right 01/2013    VASECTOMY  1982           Medications (Active prior to today's visit):  Current Outpatient Medications   Medication Sig Dispense Refill    furosemide 20 MG Oral Tab Take 1 tablet (20 mg total) by mouth Every Monday, Wednesday, and Friday. For one week - only 3 doses 10 tablet 0    sulfamethoxazole-trimethoprim -160 MG Oral Tab per tablet Take 1 tablet by mouth 2 (two) times daily. 14 tablet 0    Sildenafil Citrate 100 MG Oral Tab Take 1 tablet (100 mg total) by mouth daily as needed for Erectile Dysfunction. 6 tablet 3    Dulaglutide (TRULICITY) 1.5 FD/7.9WP Subcutaneous Solution Pen-injector Inject 0.5 mL into the skin every 7 days. 6 mL 1    Multiple Vitamins-Minerals (ONE DAILY CALCIUM/IRON) Oral Tab Take 1 tablet by mouth daily. metFORMIN 500 MG Oral Tab Take 1 tablet (500 mg total) by mouth 2 (two) times daily with meals. 180 tablet 1    Canagliflozin (INVOKANA) 100 MG Oral Tab Take 100 mg by mouth daily. 90 tablet 1    glipiZIDE ER 10 MG Oral Tablet 24 Hr Take 1 tablet (10 mg total) by mouth 2 (two) times daily. 180 tablet 3    levothyroxine (SYNTHROID) 50 MCG Oral Tab Take 1 tablet (50 mcg total) by mouth before breakfast. 90 tablet 3    allopurinol 100 MG Oral Tab Take 1 tablet (100 mg total) by mouth daily. 90 tablet 3    carvedilol 3.125 MG Oral Tab Take 1 tablet (3.125 mg total) by mouth 2 (two) times daily with meals. 180 tablet 3    Fenofibrate 160 MG Oral Tab Take 1 tablet (160 mg total) by mouth daily. 90 tablet 3    losartan 100 MG Oral Tab Take 1 tablet (100 mg total) by mouth daily. 90 tablet 3    pravastatin 40 MG Oral Tab Take 1 tablet (40 mg total) by mouth nightly. 90 tablet 3    Glucose Blood (ONETOUCH VERIO) In Vitro Strip Check glucose once daily 100 strip 0    OneTouch Delica Lancets Fine Does not apply Misc 1 lancet by Finger stick route daily. 90 each PRN    Multiple Vitamin (MULTI-VITAMIN DAILY) Oral Tab Take 1 tablet by mouth daily. aspirin 325 MG Oral Tab Take 1 tablet (325 mg total) by mouth daily. vitamin E 1000 UNITS Oral Cap Take 1 capsule (1,000 Units total) by mouth every morning. Vitamin C (VITAMIN C) 500 MG Oral Tab Take 1 tablet (500 mg total) by mouth every morning.          Allergies:    Pcn [Penicillins]       UNKNOWN      ROS:     Constitutional:  Negative for decreased activity, fever, irritability and lethargy  ENMT:  Negative for ear drainage, hearing loss and nasal drainage  Eyes:  Negative for eye discharge and vision loss  Cardiovascular:  Negative for chest pain, sob  Respiratory:  Negative for cough, dyspnea and wheezing  Gastrointestinal:  Negative for abdominal pain, constipation  Genitourinary:  Negative for dysuria and hematuria  Hema/Lymph:  Negative for easy bleeding and easy bruising  Integumentary:  Negative for pruritus and rash  Musculoskeletal:  Negative for bone/joint symptoms  Neurological:  Negative for gait disturbance  Psychiatric:  Negative for inappropriate interaction and psychiatric symptoms       10/13/23  1302   BP: 110/64   Pulse: 76     Wt Readings from Last 6 Encounters:  10/13/23 : 276 lb (125.2 kg)  10/12/23 : 276 lb 12.8 oz (125.6 kg)  09/07/23 : 270 lb (122.5 kg)  03/23/23 : (P) 276 lb (125.2 kg)  03/22/23 : 280 lb (127 kg)  03/20/23 : 279 lb 3.2 oz (126.6 kg)      PHYSICAL EXAM:     Constitutional: appears well hydrated alert and responsive   Head/Face: normocephalic  Eyes/Vision: normal extraocular motion is intact  Nose/Mouth/Throat:mucous membranes are moist    Neck/Thyroid: neck is supple without adenopathy  Skin/Hair: no abnormal bruising noted  Back/Spine: no abnormalities noted  Musculoskeletal: no deformities  Extremities: bilateral LE swelling - stable - ankle swelling   Neurological:  Grossly normal       ASSESSMENT/PLAN:     1. CKD stage III - non proteinuric    - CKD presumably secondary to diabetic nephropathy with hypertensive nephrosclerosis   - has an episode of ELAINA presumably secondary to combination of medication NSAID and likely Jardiance. - creatinine at 1.2 mg/dl with an eGFR 62ml/min - stable  - UA unremarkable- +ve glucose from invokana. UACR 50 mg - stable. on losartan 100mg daily   - serum albumin and calcium wnl  - goal Aic <7% - at goal Aic 6.9% . On metformin and invokana and trulicity. Counseled weight loss   - avoid nephrotoxins - off NSAIDs   - LSM counseled for weight loss     2. HTN:    - on carvedilol 3.125 mg BID and losartan 100mg daily   - home BP in 130s range  - weight stable in 285s   - improve leg swelling post discontinuation of amlodipine  - Take furosemide three times a week for one week (3 doses)  - Reduce fluid intake to 80-90 ounces a day     3. Hypercalcemia: wnl  now   - follow up with Dr. Virgene Ganser   - repeat and if still elevated will need further work up   - recent calcium 9.0   - hgb wnl   - on vitamin D 3 - over the counter every 2 weeks   - no other OTC calcium supplements     Follow up in 6 months       Orders This Visit:  No orders of the defined types were placed in this encounter. Meds This Visit:  Requested Prescriptions     Signed Prescriptions Disp Refills    furosemide 20 MG Oral Tab 10 tablet 0     Sig: Take 1 tablet (20 mg total) by mouth Every Monday, Wednesday, and Friday.  For one week - only 3 doses       Imaging & Referrals:  None     10/13/2023  Ankita Hernandez MD

## 2023-10-13 NOTE — PATIENT INSTRUCTIONS
Take furosemide three times a week for one week (3 doses)  Reduce fluid intake to 80-90 ounces a day   Follow up in 6 months

## 2023-10-16 ENCOUNTER — OFFICE VISIT (OUTPATIENT)
Dept: ENDOCRINOLOGY CLINIC | Facility: CLINIC | Age: 78
End: 2023-10-16

## 2023-10-16 VITALS
DIASTOLIC BLOOD PRESSURE: 66 MMHG | BODY MASS INDEX: 39 KG/M2 | SYSTOLIC BLOOD PRESSURE: 120 MMHG | HEART RATE: 84 BPM | WEIGHT: 277 LBS

## 2023-10-16 DIAGNOSIS — E03.8 SUBCLINICAL HYPOTHYROIDISM: ICD-10-CM

## 2023-10-16 DIAGNOSIS — E11.8 CONTROLLED TYPE 2 DIABETES MELLITUS WITH COMPLICATION, WITHOUT LONG-TERM CURRENT USE OF INSULIN (HCC): Primary | ICD-10-CM

## 2023-10-16 PROCEDURE — 99214 OFFICE O/P EST MOD 30 MIN: CPT | Performed by: INTERNAL MEDICINE

## 2023-10-16 PROCEDURE — 1126F AMNT PAIN NOTED NONE PRSNT: CPT | Performed by: INTERNAL MEDICINE

## 2023-12-04 ENCOUNTER — IMMUNIZATION (OUTPATIENT)
Dept: LAB | Age: 78
End: 2023-12-04
Attending: EMERGENCY MEDICINE
Payer: MEDICARE

## 2023-12-04 DIAGNOSIS — Z23 NEED FOR VACCINATION: Primary | ICD-10-CM

## 2023-12-04 PROCEDURE — 90480 ADMN SARSCOV2 VAC 1/ONLY CMP: CPT

## 2024-01-02 ENCOUNTER — PATIENT MESSAGE (OUTPATIENT)
Dept: ENDOCRINOLOGY CLINIC | Facility: CLINIC | Age: 79
End: 2024-01-02

## 2024-01-03 NOTE — TELEPHONE ENCOUNTER
Dr Hernandez,    Please see below and approved Farxiga 10mg daily if appropriate. Advised pt he should transition to Farxiga when his supply is up. At that time, to also discontinue Metformin    LOV 10/16:  -Continue Trulicity   -Continue Glipizide 10mg PO BID   -Discontinue Metformin when change to Farxiga   -Continue Invokana 100mg PO daily, verbalized understanding of risks and benefits   -Will finish current supply of Invokana then change to Farxiga 10mg PO daily, verbalized understanding of risks and benefits

## 2024-01-03 NOTE — TELEPHONE ENCOUNTER
From: Diego Bell  To: Sophia Hernandez  Sent: 1/2/2024 2:37 PM CST  Subject: Change of Medication     At my last appointment we discussed a change in my medication. We were waiting til I needed metforman to make the change. But I thought you also said you were taking me off of invokanna and putting me on a new medication. Can you send the new medication to my [facility] online acct. Before I place my refills for the other meds let me know if I need to order Invokana.   Thank you   Carlos Alberto Bell

## 2024-01-11 ENCOUNTER — PATIENT MESSAGE (OUTPATIENT)
Dept: NEPHROLOGY | Facility: CLINIC | Age: 79
End: 2024-01-11

## 2024-01-11 DIAGNOSIS — N18.30 STAGE 3 CHRONIC KIDNEY DISEASE, UNSPECIFIED WHETHER STAGE 3A OR 3B CKD (HCC): Primary | ICD-10-CM

## 2024-01-11 NOTE — TELEPHONE ENCOUNTER
From: Diego Bell  To: Wily Cary  Sent: 1/11/2024 1:59 PM CST  Subject: Tests    I scheduled an appt for April 16 please put in any blood work or tests Dr mcleod done before appt  Diego Bell

## 2024-02-08 ENCOUNTER — MED REC SCAN ONLY (OUTPATIENT)
Dept: INTERNAL MEDICINE CLINIC | Facility: CLINIC | Age: 79
End: 2024-02-08

## 2024-03-18 ENCOUNTER — LAB ENCOUNTER (OUTPATIENT)
Dept: LAB | Facility: HOSPITAL | Age: 79
End: 2024-03-18
Attending: INTERNAL MEDICINE
Payer: MEDICARE

## 2024-03-18 DIAGNOSIS — Z00.00 ROUTINE GENERAL MEDICAL EXAMINATION AT A HEALTH CARE FACILITY: ICD-10-CM

## 2024-03-18 DIAGNOSIS — Z12.5 ENCOUNTER FOR SCREENING FOR MALIGNANT NEOPLASM OF PROSTATE: ICD-10-CM

## 2024-03-18 DIAGNOSIS — E11.9 TYPE 2 DIABETES MELLITUS WITHOUT COMPLICATION, WITHOUT LONG-TERM CURRENT USE OF INSULIN (HCC): ICD-10-CM

## 2024-03-18 DIAGNOSIS — N18.30 STAGE 3 CHRONIC KIDNEY DISEASE, UNSPECIFIED WHETHER STAGE 3A OR 3B CKD (HCC): ICD-10-CM

## 2024-03-18 LAB
ALBUMIN SERPL-MCNC: 4.7 G/DL (ref 3.2–4.8)
ALBUMIN/GLOB SERPL: 1.8 {RATIO} (ref 1–2)
ALP LIVER SERPL-CCNC: 40 U/L
ALT SERPL-CCNC: 24 U/L
ANION GAP SERPL CALC-SCNC: 9 MMOL/L (ref 0–18)
AST SERPL-CCNC: 23 U/L (ref ?–34)
BASOPHILS # BLD AUTO: 0.05 X10(3) UL (ref 0–0.2)
BASOPHILS NFR BLD AUTO: 0.8 %
BILIRUB SERPL-MCNC: 0.4 MG/DL (ref 0.2–1.1)
BILIRUB UR QL: NEGATIVE
BUN BLD-MCNC: 22 MG/DL (ref 9–23)
BUN/CREAT SERPL: 18.3 (ref 10–20)
CALCIUM BLD-MCNC: 9.9 MG/DL (ref 8.7–10.4)
CHLORIDE SERPL-SCNC: 109 MMOL/L (ref 98–112)
CHOLEST SERPL-MCNC: 158 MG/DL (ref ?–200)
CLARITY UR: CLEAR
CO2 SERPL-SCNC: 24 MMOL/L (ref 21–32)
COLOR UR: YELLOW
COMPLEXED PSA SERPL-MCNC: 0.5 NG/ML (ref ?–4)
CREAT BLD-MCNC: 1.2 MG/DL
CREAT UR-SCNC: 95.1 MG/DL
DEPRECATED RDW RBC AUTO: 43.9 FL (ref 35.1–46.3)
EGFRCR SERPLBLD CKD-EPI 2021: 62 ML/MIN/1.73M2 (ref 60–?)
EOSINOPHIL # BLD AUTO: 0.19 X10(3) UL (ref 0–0.7)
EOSINOPHIL NFR BLD AUTO: 2.9 %
ERYTHROCYTE [DISTWIDTH] IN BLOOD BY AUTOMATED COUNT: 13.4 % (ref 11–15)
EST. AVERAGE GLUCOSE BLD GHB EST-MCNC: 148 MG/DL (ref 68–126)
FASTING PATIENT LIPID ANSWER: YES
FASTING STATUS PATIENT QL REPORTED: YES
GLOBULIN PLAS-MCNC: 2.6 G/DL (ref 2.8–4.4)
GLUCOSE BLD-MCNC: 144 MG/DL (ref 70–99)
GLUCOSE UR-MCNC: >1000 MG/DL
HBA1C MFR BLD: 6.8 % (ref ?–5.7)
HCT VFR BLD AUTO: 46.9 %
HDLC SERPL-MCNC: 38 MG/DL (ref 40–59)
HGB BLD-MCNC: 15.6 G/DL
HGB UR QL STRIP.AUTO: NEGATIVE
IMM GRANULOCYTES # BLD AUTO: 0.03 X10(3) UL (ref 0–1)
IMM GRANULOCYTES NFR BLD: 0.5 %
KETONES UR-MCNC: NEGATIVE MG/DL
LDLC SERPL CALC-MCNC: 94 MG/DL (ref ?–100)
LEUKOCYTE ESTERASE UR QL STRIP.AUTO: NEGATIVE
LYMPHOCYTES # BLD AUTO: 2.7 X10(3) UL (ref 1–4)
LYMPHOCYTES NFR BLD AUTO: 41.5 %
MCH RBC QN AUTO: 29.8 PG (ref 26–34)
MCHC RBC AUTO-ENTMCNC: 33.3 G/DL (ref 31–37)
MCV RBC AUTO: 89.5 FL
MICROALBUMIN UR-MCNC: 0.8 MG/DL
MICROALBUMIN/CREAT 24H UR-RTO: 8.4 UG/MG (ref ?–30)
MONOCYTES # BLD AUTO: 0.68 X10(3) UL (ref 0.1–1)
MONOCYTES NFR BLD AUTO: 10.4 %
NEUTROPHILS # BLD AUTO: 2.86 X10 (3) UL (ref 1.5–7.7)
NEUTROPHILS # BLD AUTO: 2.86 X10(3) UL (ref 1.5–7.7)
NEUTROPHILS NFR BLD AUTO: 43.9 %
NITRITE UR QL STRIP.AUTO: NEGATIVE
NONHDLC SERPL-MCNC: 120 MG/DL (ref ?–130)
OSMOLALITY SERPL CALC.SUM OF ELEC: 300 MOSM/KG (ref 275–295)
PH UR: 5 [PH] (ref 5–8)
PHOSPHATE SERPL-MCNC: 4 MG/DL (ref 2.4–5.1)
PLATELET # BLD AUTO: 234 10(3)UL (ref 150–450)
POTASSIUM SERPL-SCNC: 4.6 MMOL/L (ref 3.5–5.1)
PROT SERPL-MCNC: 7.3 G/DL (ref 5.7–8.2)
PROT UR-MCNC: NEGATIVE MG/DL
RBC # BLD AUTO: 5.24 X10(6)UL
SODIUM SERPL-SCNC: 142 MMOL/L (ref 136–145)
SP GR UR STRIP: >1.03 (ref 1–1.03)
TRIGL SERPL-MCNC: 144 MG/DL (ref 30–149)
TSI SER-ACNC: 3.36 MIU/ML (ref 0.55–4.78)
UROBILINOGEN UR STRIP-ACNC: NORMAL
VLDLC SERPL CALC-MCNC: 24 MG/DL (ref 0–30)
WBC # BLD AUTO: 6.5 X10(3) UL (ref 4–11)

## 2024-03-18 PROCEDURE — 36415 COLL VENOUS BLD VENIPUNCTURE: CPT

## 2024-03-18 PROCEDURE — 80053 COMPREHEN METABOLIC PANEL: CPT

## 2024-03-18 PROCEDURE — 83036 HEMOGLOBIN GLYCOSYLATED A1C: CPT

## 2024-03-18 PROCEDURE — 80061 LIPID PANEL: CPT

## 2024-03-18 PROCEDURE — 84100 ASSAY OF PHOSPHORUS: CPT

## 2024-03-18 PROCEDURE — 81003 URINALYSIS AUTO W/O SCOPE: CPT

## 2024-03-18 PROCEDURE — 82043 UR ALBUMIN QUANTITATIVE: CPT

## 2024-03-18 PROCEDURE — 84443 ASSAY THYROID STIM HORMONE: CPT

## 2024-03-18 PROCEDURE — 85025 COMPLETE CBC W/AUTO DIFF WBC: CPT

## 2024-03-18 PROCEDURE — 82570 ASSAY OF URINE CREATININE: CPT

## 2024-03-21 ENCOUNTER — OFFICE VISIT (OUTPATIENT)
Dept: INTERNAL MEDICINE CLINIC | Facility: CLINIC | Age: 79
End: 2024-03-21
Payer: MEDICARE

## 2024-03-21 VITALS
HEART RATE: 71 BPM | WEIGHT: 279 LBS | RESPIRATION RATE: 18 BRPM | HEIGHT: 71 IN | DIASTOLIC BLOOD PRESSURE: 83 MMHG | BODY MASS INDEX: 39.06 KG/M2 | SYSTOLIC BLOOD PRESSURE: 130 MMHG

## 2024-03-21 DIAGNOSIS — I70.0 AORTIC ATHEROSCLEROSIS (HCC): ICD-10-CM

## 2024-03-21 DIAGNOSIS — N18.30 TYPE 2 DIABETES MELLITUS WITH STAGE 3 CHRONIC KIDNEY DISEASE, WITHOUT LONG-TERM CURRENT USE OF INSULIN, UNSPECIFIED WHETHER STAGE 3A OR 3B CKD (HCC): ICD-10-CM

## 2024-03-21 DIAGNOSIS — E78.5 HYPERLIPIDEMIA LDL GOAL <100: ICD-10-CM

## 2024-03-21 DIAGNOSIS — E79.0 HYPERURICEMIA: ICD-10-CM

## 2024-03-21 DIAGNOSIS — E03.9 HYPOTHYROIDISM, UNSPECIFIED TYPE: ICD-10-CM

## 2024-03-21 DIAGNOSIS — E11.22 TYPE 2 DIABETES MELLITUS WITH STAGE 3 CHRONIC KIDNEY DISEASE, WITHOUT LONG-TERM CURRENT USE OF INSULIN, UNSPECIFIED WHETHER STAGE 3A OR 3B CKD (HCC): ICD-10-CM

## 2024-03-21 DIAGNOSIS — N52.9 ERECTILE DYSFUNCTION, UNSPECIFIED ERECTILE DYSFUNCTION TYPE: ICD-10-CM

## 2024-03-21 DIAGNOSIS — D69.2 SENILE PURPURA (HCC): ICD-10-CM

## 2024-03-21 DIAGNOSIS — Z00.00 ENCOUNTER FOR ANNUAL HEALTH EXAMINATION: Primary | ICD-10-CM

## 2024-03-21 DIAGNOSIS — G47.33 OBSTRUCTIVE SLEEP APNEA: ICD-10-CM

## 2024-03-21 DIAGNOSIS — E66.01 MORBID (SEVERE) OBESITY DUE TO EXCESS CALORIES (HCC): ICD-10-CM

## 2024-03-21 DIAGNOSIS — M15.9 PRIMARY OSTEOARTHRITIS INVOLVING MULTIPLE JOINTS: ICD-10-CM

## 2024-03-21 DIAGNOSIS — Z86.73 HISTORY OF CVA (CEREBROVASCULAR ACCIDENT): ICD-10-CM

## 2024-03-21 DIAGNOSIS — N18.30 STAGE 3 CHRONIC KIDNEY DISEASE, UNSPECIFIED WHETHER STAGE 3A OR 3B CKD (HCC): ICD-10-CM

## 2024-03-21 DIAGNOSIS — I10 ESSENTIAL HYPERTENSION: ICD-10-CM

## 2024-03-21 RX ORDER — FENOFIBRATE 160 MG/1
160 TABLET ORAL DAILY
Qty: 90 TABLET | Refills: 3 | Status: SHIPPED | OUTPATIENT
Start: 2024-03-21

## 2024-03-21 RX ORDER — LEVOTHYROXINE SODIUM 0.05 MG/1
50 TABLET ORAL
Qty: 90 TABLET | Refills: 3 | Status: SHIPPED | OUTPATIENT
Start: 2024-03-21

## 2024-03-21 RX ORDER — CARVEDILOL 3.12 MG/1
3.12 TABLET ORAL 2 TIMES DAILY WITH MEALS
Qty: 180 TABLET | Refills: 3 | Status: SHIPPED | OUTPATIENT
Start: 2024-03-21

## 2024-03-21 RX ORDER — SILDENAFIL 100 MG/1
100 TABLET, FILM COATED ORAL
Qty: 6 TABLET | Refills: 3 | Status: SHIPPED | OUTPATIENT
Start: 2024-03-21

## 2024-03-21 RX ORDER — LOSARTAN POTASSIUM 100 MG/1
100 TABLET ORAL DAILY
Qty: 90 TABLET | Refills: 3 | Status: SHIPPED | OUTPATIENT
Start: 2024-03-21

## 2024-03-21 RX ORDER — ALLOPURINOL 100 MG/1
100 TABLET ORAL DAILY
Qty: 90 TABLET | Refills: 3 | Status: SHIPPED | OUTPATIENT
Start: 2024-03-21

## 2024-03-21 RX ORDER — PRAVASTATIN SODIUM 40 MG
40 TABLET ORAL NIGHTLY
Qty: 90 TABLET | Refills: 3 | Status: SHIPPED | OUTPATIENT
Start: 2024-03-21

## 2024-03-21 NOTE — PATIENT INSTRUCTIONS
Diego Bell's SCREENING SCHEDULE   Tests on this list are recommended by your physician but may not be covered, or covered at this frequency, by your insurer.   Please check with your insurance carrier before scheduling to verify coverage.   PREVENTATIVE SERVICES FREQUENCY &  COVERAGE DETAILS LAST COMPLETION DATE   Diabetes Screening    Fasting Blood Sugar / Glucose    One screening every 12 months if never tested or if previously tested but not diagnosed with pre-diabetes   One screening every 6 months if diagnosed with pre-diabetes Lab Results   Component Value Date     (H) 03/18/2024        Cardiovascular Disease Screening    Lipid Panel  Cholesterol  Lipoprotein (HDL)  Triglycerides Covered every 5 years for all Medicare beneficiaries without apparent signs or symptoms of cardiovascular disease Lab Results   Component Value Date    CHOLEST 158 03/18/2024    HDL 38 (L) 03/18/2024    LDL 94 03/18/2024    TRIG 144 03/18/2024         Electrocardiogram (EKG)   Covered if needed at Welcome to Medicare, and non-screening if indicated for medical reasons 11/09/2015      Ultrasound Screening for Abdominal Aortic Aneurysm (AAA) Covered once in a lifetime for one of the following risk factors   • Men who are 65-75 years old and have ever smoked   • Anyone with a family history -     Colorectal Cancer Screening  Covered for ages 50-85; only need ONE of the following:    Colonoscopy   Covered every 10 years    Covered every 2 years if patient is at high risk or previous colonoscopy was abnormal -    Health Maintenance   Topic Date Due   • Colorectal Cancer Screening  Discontinued       Flexible Sigmoidoscopy   Covered every 4 years -    Fecal Occult Blood Test Covered annually -   Prostate Cancer Screening    Prostate-Specific Antigen (PSA) Annually Lab Results   Component Value Date    PSA 0.4 12/07/2017     There are no preventive care reminders to display for this patient.   Immunizations    Influenza Covered  once per flu season  Please get every year -  No recommendations at this time    Pneumococcal Each vaccine ([id number] & [id number]) covered once after 65 Prevnar 13: -    Venoesdky21: -     Pneumococcal Vaccination(1 of 2 - PCV) Never done    Hepatitis B One screening covered for patients with certain risk factors   -  No recommendations at this time    Tetanus Toxoid Not covered by Medicare Part B unless medically necessary (cut with metal); may be covered with your pharmacy prescription benefits -    Tetanus, Diptheria and Pertusis TD and TDaP Not covered by Medicare Part B -  No recommendations at this time    Zoster Not covered by Medicare Part B; may be covered with your pharmacy  prescription benefits -  Zoster Vaccines(1 of 2) Never done     Diabetes      Hemoglobin A1C Annually; if last result is elevated, may be asked to retest more frequently.    Medicare covers every 3 months Lab Results   Component Value Date     (H) 03/18/2024    A1C 6.8 (H) 03/18/2024       No recommendations at this time    Creat/alb ratio Annually Lab Results   Component Value Date    MICROALBCREA 8.4 03/18/2024       LDL Annually Lab Results   Component Value Date    LDL 94 03/18/2024       Dilated Eye Exam Annually Last Diabetic Eye Exam:  Last Dilated Eye Exam: 02/06/24  Eye Exam shows Diabetic Retinopathy?: No       Annual Monitoring of Persistent Medications (ACE/ARB, digoxin diuretics, anticonvulsants)    Potassium Annually Lab Results   Component Value Date    K 4.6 03/18/2024         Creatinine   Annually Lab Results   Component Value Date    CREATSERUM 1.20 03/18/2024         BUN Annually Lab Results   Component Value Date    BUN 22 03/18/2024       Drug Serum Conc Annually No results found for: \"DIGOXIN\", \"DIG\", \"VALP\"

## 2024-03-21 NOTE — PROGRESS NOTES
Subjective:   Diego Bell is a 78 year old male who presents for a Medicare Subsequent Annual Wellness visit (Pt already had Initial Annual Wellness) and scheduled follow up of multiple significant but stable problems.   Patient comes for his Medicare physical  Off of the metformin (didn't like what he read on it ) and Invokana and now on Farxiga-hasn't noticed a big change   [facility]       HISTORY   He is asking for a parking placard it is difficult for him at times especially with knee pain and hip pain-getting injections at [facility] for his knees and also had hip replacement and has to use cane especially for balance      History  Dr. Gutierrez from [facility] orthopedic and gets shots in the knees and also sees dr amaya      [facility]  Hypertension  hyperlipidemia  Hypothyroid  Osteoarthritis- knees  neck hands hips   Hyperuricemia  dm2-sees Dr. Pike  piter -on CPAP and sees Dr. rocha  CKD 3-- sees dr jeremiah carmona  ASD- heart dr   Atherosclerosis  Obese   History of COVID April 2022     Urologist -osiris   ent - dr martha Mosquera dr - dr jeremiah miranda   Heart dr   Eye  - dr wilde    ---------------------------------------------------------------------------------------------       History/Other:   Fall Risk Assessment:   He has been screened for Falls and is High Risk. Fall Prevention information provided to patient in After Visit Summary.    Do you feel unsteady when standing or walking?: Yes  Do you worry about falling?: Yes  Have you fallen in the past year?: No     Cognitive Assessment:   He had a completely normal cognitive assessment - see flowsheet entries     Functional Ability/Status:   Diego Bell has some abnormal functions as listed below:  He has Hearing problems based on screening of functional status.He has Walking problems based on screening of functional status.       Depression Screening (PHQ-2/PHQ-9): PHQ-2 SCORE: 0  , done  3/21/2024   If you checked off any problems, how difficult have these problems made it for you to do your work, take care of things at home, or get along with other people?: Not difficult at all    Last [facility] Suicide Screening on 3/21/2024 was No Risk.          Advanced Directives:   He does have a Living Will but we do NOT have it on file in Epic.    He does have a POA but we do NOT have it on file in Epic.    Discussed Advance Care Planning with patient (and family/surrogate if present). Standard forms made available to patient in After Visit Summary.      Patient Active Problem List   Diagnosis    Essential hypertension    Type 2 diabetes mellitus (HCC)    Osteoarthritis    Hyperlipidemia LDL goal <100    History of CVA (cerebrovascular accident)    Obstructive sleep apnea    Aortic atherosclerosis (HCC)    Hyperuricemia    Stage 3 chronic kidney disease, unspecified whether stage 3a or 3b CKD (HCC)    Senile purpura (HCC)    Type 2 diabetes mellitus with diabetic chronic kidney disease (HCC)    Morbid (severe) obesity due to excess calories (HCC)    Hypothyroidism     Allergies:  He is allergic to pcn [penicillins].    Current Medications:  Outpatient Medications Marked as Taking for the 3/21/24 encounter (Office Visit) with Desire Rosenberg MD   Medication Sig    allopurinol 100 MG Oral Tab Take 1 tablet (100 mg total) by mouth daily.    carvedilol 3.125 MG Oral Tab Take 1 tablet (3.125 mg total) by mouth 2 (two) times daily with meals.    Fenofibrate 160 MG Oral Tab Take 1 tablet (160 mg total) by mouth daily.    losartan 100 MG Oral Tab Take 1 tablet (100 mg total) by mouth daily.    pravastatin 40 MG Oral Tab Take 1 tablet (40 mg total) by mouth nightly.    Sildenafil Citrate 100 MG Oral Tab Take 1 tablet (100 mg total) by mouth daily as needed for Erectile Dysfunction.    levothyroxine (SYNTHROID) 50 MCG Oral Tab Take 1 tablet (50 mcg total) by mouth before breakfast.    dapagliflozin (FARXIGA) 10 MG Oral  Tab Take 1 tablet (10 mg total) by mouth daily.    Dulaglutide (TRULICITY) 1.5 MG/0.5ML Subcutaneous Solution Pen-injector Inject 0.5 mL into the skin every 7 days.    Multiple Vitamins-Minerals (ONE DAILY CALCIUM/IRON) Oral Tab Take 1 tablet by mouth daily.    glipiZIDE ER 10 MG Oral Tablet 24 Hr Take 1 tablet (10 mg total) by mouth 2 (two) times daily.    Glucose Blood (ONETOUCH VERIO) In Vitro Strip Check glucose once daily    OneTouch Delica Lancets Fine Does not apply Misc 1 lancet by Finger stick route daily.    Multiple Vitamin (MULTI-VITAMIN DAILY) Oral Tab Take 1 tablet by mouth daily.    aspirin 325 MG Oral Tab Take 1 tablet (325 mg total) by mouth daily.    vitamin E 1000 UNITS Oral Cap Take 1 capsule (1,000 Units total) by mouth every morning.    Vitamin C (VITAMIN C) 500 MG Oral Tab Take 1 tablet (500 mg total) by mouth every morning.       Medical History:  He  has a past medical history of Acute, but ill-defined, cerebrovascular disease, Aortic atherosclerosis (HCC), Arthritis, ASD (atrial septal defect) (HCC), Chronic kidney disease, stage II (mild), Diabetes (HCC), Diabetes mellitus (HCC), Dyslipidemia, Essential hypertension, Hearing loss, History of CVA (cerebrovascular accident), Hyperlipidemia, Hyperuricemia, Hypothyroidism, Morbid obesity (HCC), Obesity, Obstructive sleep apnea, Osteoarthritis, Sleep apnea, and Type 2 diabetes mellitus (HCC).  Surgical History:  He  has a past surgical history that includes total hip replacement (Right, 01/2013); leg/ankle surgery proc unlisted (Left, 1980's); vasectomy (1982); Inguinal hernia repair (Right, 1979); hip replacement surgery (Right); hernia surgery (Right); skin surgery; and other surgical history.   Family History:  His family history includes CVA in his father; Diabetes in his brother, father, and mother; Heart Disease in his mother; Heart Disorder in his brother, father, and mother; Hypertension in his father and mother; Stroke in his father  and mother.  Social History:  He  reports that he quit smoking about 41 years ago. His smoking use included cigarettes. He has a 30 pack-year smoking history. He has never used smokeless tobacco. He reports current alcohol use. He reports that he does not use drugs.    Tobacco:  He smoked tobacco in the past but quit greater than 12 months ago.  Social History    Tobacco Use      Smoking status: Former        Packs/day: 1.50        Years: 20.00        Additional pack years: 0.00        Total pack years: 30.00        Types: Cigarettes        Quit date: 1983        Years since quittin.2      Smokeless tobacco: Never         CAGE Alcohol Screen:   CAGE screening score of 0 on 3/14/2024, showing low risk of alcohol abuse.      Patient Care Team:  Desire Rosenberg MD as PCP - General (Internal Medicine)  William Neves MD as Consulting Physician (NEUROLOGY)    Review of Systems  GENERAL: feels well otherwise  SKIN: denies any unusual skin lesions  EYES: denies blurred vision or double vision  HEENT: denies nasal congestion, sinus pain or ST  LUNGS: denies shortness of breath with exertion  CARDIOVASCULAR: denies chest pain on exertion  GI: denies abdominal pain, denies heartburn  : 0 per night nocturia, no complaint of urinary incontinence  MUSCULOSKELETAL: +  back pain  NEURO: denies headaches  PSYCHE: denies depression or anxiety  HEMATOLOGIC: denies hx of anemia  ENDOCRINE: + thyroid history  ALL/ASTHMA: denies hx of allergy or asthma    Objective:   Physical Exam  GENERAL: well developed, well nourished,in no apparent distress  SKIN: no rashes,no suspicious lesions  HEENT: atraumatic, normocephalic,ears and throat are clear, no frontal or maxillary sinus tenderness, pupils equal reactive to light bilaterally, extra ocular muscles intact  NECK: supple,no adenopathy,nontender   LUNGS: clear to auscultation, no wheeze  CARDIO: RRR without murmur  GI: good BS's,no masses or tenderness  EXTREMITIES: no cyanosis,  or edema    /83 (BP Location: Right arm, Patient Position: Sitting, Cuff Size: large)   Pulse 71   Resp 18   Ht 5' 11\" (1.803 m)   Wt 279 lb (126.6 kg)   BMI 38.91 kg/m²  Estimated body mass index is 38.91 kg/m² as calculated from the following:    Height as of this encounter: 5' 11\" (1.803 m).    Weight as of this encounter: 279 lb (126.6 kg).    Medicare Hearing Assessment:   Hearing Screening    Time taken: 3/21/2024  9:04 AM  Screening Method: Questionnaire  I have a problem hearing over the telephone: No I have trouble following the conversations when two or more people are talking at the same time: No   I have trouble understanding things on the TV: No I have to strain to understand conversations: No   I have to worry about missing the telephone ring or doorbell: No I have trouble hearing conversations in a noisy background such as a crowded room or restaurant: No   I get confused about where sounds come from: No I misunderstand some words in a sentence and need to ask people to repeat themselves: No   I especially have trouble understanding the speech of women and children: No I have trouble understanding the speaker in a large room such as at a meeting or place of Taoist: No   Many people I talk to seem to mumble (or don't speak clearly): No People get annoyed because I misunderstand what they say: No   I misunderstand what others are saying and make inappropriate responses: No I avoid social activities because I cannot hear well and fear I will reply improperly: No   Family members and friends have told me they think I may have hearing loss: No                   Assessment & Plan:   Diego Bell is a 78 year old male who presents for a Medicare Assessment.     1. Encounter for annual health examination (Primary)  Advised patient to watch what he eats and exercise, seatbelt use no texting driving, sunscreen use advised   2. Essential hypertension  -     Carvedilol; Take 1 tablet (3.125 mg  total) by mouth 2 (two) times daily with meals.  Dispense: 180 tablet; Refill: 3  Advised pt to follow a low salt , low sodium (including fast foods and processed foods), can look up DASH diet, exercise 30 min a day , monitor bp   Continue medications  3. Hyperlipidemia LDL goal <100  -     Fenofibrate; Take 1 tablet (160 mg total) by mouth daily.  Dispense: 90 tablet; Refill: 3  -     Pravastatin Sodium; Take 1 tablet (40 mg total) by mouth nightly.  Dispense: 90 tablet; Refill: 3  Advised to follow low-fat low-cholesterol diet and exercise 30 minutes a day as tolerated, continue medications-refilled   4. Hyperuricemia  -     Allopurinol; Take 1 tablet (100 mg total) by mouth daily.  Dispense: 90 tablet; Refill: 3  Stable on meds , continue   5. Hypothyroidism, unspecified type  -     Levothyroxine Sodium; Take 1 tablet (50 mcg total) by mouth before breakfast.  Dispense: 90 tablet; Refill: 3  Monitor ,Stable on meds , continue   6. Aortic atherosclerosis (HCC)  Overview:  CT scan 4-14  On aspirin and statin   7. Morbid (severe) obesity due to excess calories (HCC)   continue to follow a healthy diet watching what he eats and exercising 30 minutes a day as tolerated     8. Senile purpura (HCC)  Stable   9. Stage 3 chronic kidney disease, unspecified whether stage 3a or 3b CKD (HCC)  Monitor , avoid nsaids   10. History of CVA (cerebrovascular accident)  Stable - on asa and statin   11. Primary osteoarthritis involving multiple joints  Overview:  Status post right KANCHAN January 2013  Stable   12. Obstructive sleep apnea  Stable on cpap     Erectile dysfunction   Other orders  -     Sildenafil Citrate; Take 1 tablet (100 mg total) by mouth daily as needed for Erectile Dysfunction.  Dispense: 6 tablet; Refill: 3  Stable , cont meds       13. Type 2 diabetes mellitus with stage 3 chronic kidney disease, without long-term current use of insulin, unspecified whether stage 3a or 3b CKD (HCC)  -     Losartan Potassium; Take  1 tablet (100 mg total) by mouth daily.  Dispense: 90 tablet; Refill: 3  Hba1c 7.2 on 9/19 and 6.9 on 9/2020 and 7.3 on 3/2021 and 7.4 on 9/2021 and 6.9 on 8/3/2022 and 3/2023 and 6.5 on 10/2023   [facility] 10/2023  Eye exam -sees - dr wilde  jan 2022   On Asa , arb, statin   Foot - 10/12/2023  Diet -- advised to follow a low carb, low sugar diet , try to be consistent                Exercise 30 min a day                 Preventative medicine  Colonoscopy- as he declined cscope and FIT testing   Labs-3/2024 and 10/2023   reviewed   Allergy to flu shot -- syncope with it   Declined pneumonia shot and flu vaccine   Osteoarthritis of multiple joints parking placard-scanned      The patient indicates understanding of these issues and agrees to the plan.  Reinforced healthy diet, lifestyle, and exercise.      Return in 3 months (on 6/21/2024).     Desire Rosenberg MD, 3/21/2024     Supplementary Documentation:   General Health:  In the past six months, have you lost more than 10 pounds without trying?: 2 - No  Has your appetite been poor?: No  Type of Diet: Balanced  How would you describe your daily physical activity?: Light  How would you describe your current health state?: Good  How do you maintain positive mental well-being?: Social Interaction;Games;Visiting Friends  On a scale of 0 to 10, with 0 being no pain and 10 being severe pain, what is your pain level?: 3 - (Mild)  In the past six months, have you experienced urine leakage?: 0-No  At any time do you feel concerned for the safety/well-being of yourself and/or your children, in your home or elsewhere?: No  Have you had any immunizations at another office such as Influenza, Hepatitis B, Tetanus, or Pneumococcal?: No          Diego Bell's SCREENING SCHEDULE   Tests on this list are recommended by your physician but may not be covered, or covered at this frequency, by your insurer.   Please check with your insurance carrier before scheduling to verify  coverage.   PREVENTATIVE SERVICES FREQUENCY &  COVERAGE DETAILS LAST COMPLETION DATE   Diabetes Screening    Fasting Blood Sugar / Glucose    One screening every 12 months if never tested or if previously tested but not diagnosed with pre-diabetes   One screening every 6 months if diagnosed with pre-diabetes Lab Results   Component Value Date     (H) 03/18/2024        Cardiovascular Disease Screening    Lipid Panel  Cholesterol  Lipoprotein (HDL)  Triglycerides Covered every 5 years for all Medicare beneficiaries without apparent signs or symptoms of cardiovascular disease Lab Results   Component Value Date    CHOLEST 158 03/18/2024    HDL 38 (L) 03/18/2024    LDL 94 03/18/2024    TRIG 144 03/18/2024         Electrocardiogram (EKG)   Covered if needed at Welcome to Medicare, and non-screening if indicated for medical reasons 11/09/2015      Ultrasound Screening for Abdominal Aortic Aneurysm (AAA) Covered once in a lifetime for one of the following risk factors    Men who are 65-75 years old and have ever smoked    Anyone with a family history -     Colorectal Cancer Screening  Covered for ages 50-85; only need ONE of the following:    Colonoscopy   Covered every 10 years    Covered every 2 years if patient is at high risk or previous colonoscopy was abnormal -    Health Maintenance   Topic Date Due    Colorectal Cancer Screening  Discontinued       Flexible Sigmoidoscopy   Covered every 4 years -    Fecal Occult Blood Test Covered annually -   Prostate Cancer Screening    Prostate-Specific Antigen (PSA) Annually Lab Results   Component Value Date    PSA 0.4 12/07/2017     There are no preventive care reminders to display for this patient.   Immunizations    Influenza Covered once per flu season  Please get every year -  No recommendations at this time    Pneumococcal Each vaccine ([id number] & [id number]) covered once after 65 Prevnar 13: -    [id number]: -     Pneumococcal Vaccination(1 of 2 - PCV) Never  done    Hepatitis B One screening covered for patients with certain risk factors   -  No recommendations at this time    Tetanus Toxoid Not covered by Medicare Part B unless medically necessary (cut with metal); may be covered with your pharmacy prescription benefits -    Tetanus, Diptheria and Pertusis TD and TDaP Not covered by Medicare Part B -  No recommendations at this time    Zoster Not covered by Medicare Part B; may be covered with your pharmacy  prescription benefits -  Zoster Vaccines(1 of 2) Never done     Diabetes      Hemoglobin A1C Annually; if last result is elevated, may be asked to retest more frequently.    Medicare covers every 3 months Lab Results   Component Value Date     (H) 03/18/2024    A1C 6.8 (H) 03/18/2024       No recommendations at this time    Creat/alb ratio Annually Lab Results   Component Value Date    MICROALBCREA 8.4 03/18/2024       LDL Annually Lab Results   Component Value Date    LDL 94 03/18/2024       Dilated Eye Exam Annually Last Diabetic Eye Exam:  Last Dilated Eye Exam: 02/06/24  Eye Exam shows Diabetic Retinopathy?: No       Annual Monitoring of Persistent Medications (ACE/ARB, digoxin diuretics, anticonvulsants)    Potassium Annually Lab Results   Component Value Date    K 4.6 03/18/2024         Creatinine   Annually Lab Results   Component Value Date    CREATSERUM 1.20 03/18/2024         BUN Annually Lab Results   Component Value Date    BUN 22 03/18/2024       Drug Serum Conc Annually No results found for: \"DIGOXIN\", \"DIG\", \"VALP\"

## 2024-03-24 DIAGNOSIS — E11.9 TYPE 2 DIABETES MELLITUS WITHOUT COMPLICATION, WITHOUT LONG-TERM CURRENT USE OF INSULIN (HCC): ICD-10-CM

## 2024-03-24 PROBLEM — N52.9 ERECTILE DYSFUNCTION: Status: ACTIVE | Noted: 2024-03-24

## 2024-03-25 RX ORDER — DULAGLUTIDE 1.5 MG/.5ML
0.5 INJECTION, SOLUTION SUBCUTANEOUS
Qty: 6 ML | Refills: 1 | Status: SHIPPED | OUTPATIENT
Start: 2024-03-25

## 2024-03-25 NOTE — TELEPHONE ENCOUNTER
Endocrine Refill protocol for oral and injectable diabetic medications      Protocol Criteria:    -Appointment with Endocrinology completed in the last 6 months or scheduled in the next 3 months    -A1c result <8.5% in the past 6 months      Verify the above has been completed or scheduled in the appropriate timeline. If so can send a 90 day supply with 1 refill.            Last completed office visit: 10/16/2023  Next scheduled Follow up: 5/3/2024  Last A1c result: 10/9/2023     6.5    RX refill per protocol.

## 2024-03-26 ENCOUNTER — TELEPHONE (OUTPATIENT)
Dept: ENDOCRINOLOGY CLINIC | Facility: CLINIC | Age: 79
End: 2024-03-26

## 2024-03-26 DIAGNOSIS — E11.9 TYPE 2 DIABETES MELLITUS WITHOUT COMPLICATION, WITHOUT LONG-TERM CURRENT USE OF INSULIN (HCC): ICD-10-CM

## 2024-03-26 NOTE — TELEPHONE ENCOUNTER
Sadi from [facility] states Trulicity is on backorder and wants to know if there is any alternative please follow up

## 2024-04-02 ENCOUNTER — OFFICE VISIT (OUTPATIENT)
Dept: NEPHROLOGY | Facility: CLINIC | Age: 79
End: 2024-04-02
Payer: MEDICARE

## 2024-04-02 VITALS
DIASTOLIC BLOOD PRESSURE: 74 MMHG | HEART RATE: 60 BPM | BODY MASS INDEX: 39 KG/M2 | WEIGHT: 281 LBS | SYSTOLIC BLOOD PRESSURE: 137 MMHG

## 2024-04-02 DIAGNOSIS — I10 ESSENTIAL HYPERTENSION: ICD-10-CM

## 2024-04-02 DIAGNOSIS — N18.30 STAGE 3 CHRONIC KIDNEY DISEASE, UNSPECIFIED WHETHER STAGE 3A OR 3B CKD (HCC): Primary | ICD-10-CM

## 2024-04-02 PROCEDURE — 99214 OFFICE O/P EST MOD 30 MIN: CPT | Performed by: INTERNAL MEDICINE

## 2024-04-02 NOTE — PROGRESS NOTES
Progress Note     Diego Bell is a 78 yrs old male with pmh of DM x 20 yrs, HTN X 20 yrs, CKD stage II, sleep apnea, morbidly obese on CPAP, right hip replacement, CVA who presented today for follow up     Patient baseline creatinine is around 1.0-1.1 till 2013, his creatinine was 1.4 mg/dl with an eGFR 48 ml/min in 2015. His BUN/Cr from 3/1/16 -> 43/1.90 mg/dl with an eGFR 35 ml/min and now improved to his baseline of 1.1 mg/dl. His UA is 2015 was negative for proteinuria and microscopic hematuria and UACR has been negative. His Aic has been in low 7s for last 2 years.     Was taking OTC Aleve 3-4 times a week, denies any herbal supplement. - off of Aleve. Denies any personal and family history of nephrolithiasis. No FH of kidney disease or on dialysis.     Had MRI brain done which showed Remote bilateral thalamic lacunar infarcts. Remote left cerebellar.hemisphere lacunar infarct.    Home BP in 130/80s range - no leg swelling. No urinary complaints. Started on farxiga 3 weeks     HISTORY:  Past Medical History:   Diagnosis Date    Acute, but ill-defined, cerebrovascular disease     Aortic atherosclerosis (HCC)     CT scan 4-14    Arthritis     ASD (atrial septal defect) (HCC)     WILD 9-16    Chronic kidney disease, stage II (mild)     Diabetes (HCC)     Diabetes mellitus (HCC)     Dyslipidemia     Essential hypertension     Hearing loss     History of CVA (cerebrovascular accident)     Multiple, asymptomatic    Hyperlipidemia     Hyperuricemia     Hypothyroidism     Morbid obesity (HCC)     Obesity     Obstructive sleep apnea     On nightly CPAP    Osteoarthritis     Status post right KANCHAN January 2013    Sleep apnea     Type 2 diabetes mellitus (HCC)       Past Surgical History:   Procedure Laterality Date    HERNIA SURGERY Right     HIP REPLACEMENT SURGERY Right     INGUINAL HERNIA REPAIR Right 1979    LEG/ANKLE SURGERY PROC UNLISTED Left 1980's    Bone chips removed from ankle    OTHER SURGICAL HISTORY       Ankle    SKIN SURGERY      TOTAL HIP REPLACEMENT Right 01/2013    VASECTOMY  1982           Medications (Active prior to today's visit):  Current Outpatient Medications   Medication Sig Dispense Refill    TRULICITY 1.5 MG/0.5ML Subcutaneous Solution Pen-injector INJECT 0.5ML (=1.5 MG)     SUBCUTANEOUSLY EVERY 7 DAYS 6 mL 1    allopurinol 100 MG Oral Tab Take 1 tablet (100 mg total) by mouth daily. 90 tablet 3    carvedilol 3.125 MG Oral Tab Take 1 tablet (3.125 mg total) by mouth 2 (two) times daily with meals. 180 tablet 3    Fenofibrate 160 MG Oral Tab Take 1 tablet (160 mg total) by mouth daily. 90 tablet 3    losartan 100 MG Oral Tab Take 1 tablet (100 mg total) by mouth daily. 90 tablet 3    pravastatin 40 MG Oral Tab Take 1 tablet (40 mg total) by mouth nightly. 90 tablet 3    Sildenafil Citrate 100 MG Oral Tab Take 1 tablet (100 mg total) by mouth daily as needed for Erectile Dysfunction. 6 tablet 3    levothyroxine (SYNTHROID) 50 MCG Oral Tab Take 1 tablet (50 mcg total) by mouth before breakfast. 90 tablet 3    dapagliflozin (FARXIGA) 10 MG Oral Tab Take 1 tablet (10 mg total) by mouth daily. 90 tablet 0    glipiZIDE ER 10 MG Oral Tablet 24 Hr Take 1 tablet (10 mg total) by mouth 2 (two) times daily. 180 tablet 3    Multiple Vitamin (MULTI-VITAMIN DAILY) Oral Tab Take 1 tablet by mouth daily.      aspirin 325 MG Oral Tab Take 1 tablet (325 mg total) by mouth daily.      vitamin E 1000 UNITS Oral Cap Take 1 capsule (1,000 Units total) by mouth every morning.      Vitamin C (VITAMIN C) 500 MG Oral Tab Take 1 tablet (500 mg total) by mouth every morning.      Multiple Vitamins-Minerals (ONE DAILY CALCIUM/IRON) Oral Tab Take 1 tablet by mouth daily.      Glucose Blood (ONETOUCH VERIO) In Vitro Strip Check glucose once daily 100 strip 0    OneTouch Delica Lancets Fine Does not apply Misc 1 lancet by Finger stick route daily. 90 each PRN       Allergies:  Allergies   Allergen Reactions    Pcn [Penicillins]  UNKNOWN         ROS:     Constitutional:  Negative for decreased activity, fever, irritability and lethargy  ENMT:  Negative for ear drainage, hearing loss and nasal drainage  Eyes:  Negative for eye discharge and vision loss  Cardiovascular:  Negative for chest pain, sob  Respiratory:  Negative for cough, dyspnea and wheezing  Gastrointestinal:  Negative for abdominal pain, constipation  Genitourinary:  Negative for dysuria and hematuria  Hema/Lymph:  Negative for easy bleeding and easy bruising  Integumentary:  Negative for pruritus and rash  Musculoskeletal:  Negative for bone/joint symptoms  Neurological:  Negative for gait disturbance  Psychiatric:  Negative for inappropriate interaction and psychiatric symptoms      Vitals:    04/02/24 1025   BP: 137/74   Pulse: 60     Wt Readings from Last 6 Encounters:   04/02/24 281 lb (127.5 kg)   03/21/24 279 lb (126.6 kg)   10/16/23 277 lb (125.6 kg)   10/13/23 276 lb (125.2 kg)   10/12/23 276 lb 12.8 oz (125.6 kg)   09/07/23 270 lb (122.5 kg)       PHYSICAL EXAM:     Constitutional: appears well hydrated alert and responsive   Head/Face: normocephalic  Eyes/Vision: normal extraocular motion is intact  Nose/Mouth/Throat:mucous membranes are moist    Neck/Thyroid: neck is supple without adenopathy  Skin/Hair: no abnormal bruising noted  Back/Spine: no abnormalities noted  Musculoskeletal: no deformities  Extremities: bilateral LE swelling - stable - ankle swelling   Neurological:  Grossly normal       ASSESSMENT/PLAN:     1. CKD stage III - non proteinuric    - CKD presumably secondary to diabetic nephropathy with hypertensive nephrosclerosis   - has an episode of ELAINA presumably secondary to combination of medication NSAID and likely Jardiance.   - creatinine at 1.2 mg/dl with an eGFR 62ml/min - stable  - UA unremarkable- +ve glucose from invokana. UACR 50 mg - stable.  on losartan 100mg daily   - serum albumin and calcium wnl  - goal Aic <7% - at goal Aic 6.8% . On  trulicity and glipizide. Started on farxiga.   - Counseled weight loss   - avoid nephrotoxins - off NSAIDs   - LSM counseled for weight loss     2. HTN:    - on carvedilol 3.125 mg BID and losartan 100mg daily   - home BP in 130s range  - weight stable in 285s   - improve leg swelling post discontinuation of amlodipine     3. Hypercalcemia: wnl  now   - follow up with Dr. Hernandez   - recent calcium 9.9  - hgb wnl   - on vitamin D 3 - over the counter every 2 weeks   - no other OTC calcium supplements     Follow up in 6-7 months     Lab tests per Dr. Rosenberg       Orders This Visit:  No orders of the defined types were placed in this encounter.      Meds This Visit:  Requested Prescriptions      No prescriptions requested or ordered in this encounter       Imaging & Referrals:  None     4/2/2024  Wily Cary MD

## 2024-04-02 NOTE — TELEPHONE ENCOUNTER
Called pharmacy and inquired on what GLP1 they have available in the mail order pharmacy:    Mounjaro: out stock    Victoza: out victoza   Ozempic 2 mg out of stock, all others available   Trulicity: out stock     [facility] local pharmacies provided by [facility]:     [address] [facility] [phone]  Only trulicity 0.75mg     [address] [facility] [phone]  No doses available      [address] [phone]  No doses available     Called pt to notify and advise to call other pharmacies to inquire on GLP1 and doses available. Pt verbalized understanding.

## 2024-04-10 NOTE — TELEPHONE ENCOUNTER
[facility]    Trulicity 1.5/0.5 pen    Pl send directions

## 2024-04-17 RX ORDER — DULAGLUTIDE 1.5 MG/.5ML
0.5 INJECTION, SOLUTION SUBCUTANEOUS
Qty: 6 ML | Refills: 1 | Status: SHIPPED | OUTPATIENT
Start: 2024-04-17

## 2024-04-19 NOTE — TELEPHONE ENCOUNTER
Zev from [facility] order is on hold it is currently on back order Trulicity 1.5/0.5 pen please follow up ph # [phone] opt 2 reference # [id number]

## 2024-04-24 ENCOUNTER — TELEPHONE (OUTPATIENT)
Dept: ENDOCRINOLOGY CLINIC | Facility: CLINIC | Age: 79
End: 2024-04-24

## 2024-04-24 NOTE — TELEPHONE ENCOUNTER
Patient wife calling, she states that she was unaware of the prescription for Ozempic.  She states that patient was able to find Trulicity at a local pharmacy.

## 2024-05-03 ENCOUNTER — OFFICE VISIT (OUTPATIENT)
Dept: ENDOCRINOLOGY CLINIC | Facility: CLINIC | Age: 79
End: 2024-05-03

## 2024-05-03 VITALS
WEIGHT: 280 LBS | SYSTOLIC BLOOD PRESSURE: 108 MMHG | HEART RATE: 70 BPM | DIASTOLIC BLOOD PRESSURE: 71 MMHG | BODY MASS INDEX: 40.09 KG/M2 | HEIGHT: 70 IN

## 2024-05-03 DIAGNOSIS — E03.8 SUBCLINICAL HYPOTHYROIDISM: Primary | ICD-10-CM

## 2024-05-03 DIAGNOSIS — E11.9 TYPE 2 DIABETES MELLITUS WITHOUT COMPLICATION, WITHOUT LONG-TERM CURRENT USE OF INSULIN (HCC): ICD-10-CM

## 2024-05-03 LAB
GLUCOSE BLOOD: 150
TEST STRIP LOT #: NORMAL NUMERIC

## 2024-05-03 PROCEDURE — 82947 ASSAY GLUCOSE BLOOD QUANT: CPT | Performed by: INTERNAL MEDICINE

## 2024-05-03 PROCEDURE — 99214 OFFICE O/P EST MOD 30 MIN: CPT | Performed by: INTERNAL MEDICINE

## 2024-05-03 RX ORDER — BLOOD SUGAR DIAGNOSTIC
STRIP MISCELLANEOUS
Qty: 100 STRIP | Refills: 1 | Status: SHIPPED | OUTPATIENT
Start: 2024-05-03

## 2024-05-03 RX ORDER — LANCETS 33 GAUGE
1 EACH MISCELLANEOUS DAILY
Qty: 100 EACH | Refills: 1 | Status: SHIPPED | OUTPATIENT
Start: 2024-05-03

## 2024-05-03 RX ORDER — DAPAGLIFLOZIN 10 MG/1
10 TABLET, FILM COATED ORAL DAILY
Qty: 90 TABLET | Refills: 1 | Status: SHIPPED | OUTPATIENT
Start: 2024-05-03 | End: 2024-10-30

## 2024-05-03 RX ORDER — GLIPIZIDE 10 MG/1
10 TABLET, FILM COATED, EXTENDED RELEASE ORAL 2 TIMES DAILY
Qty: 180 TABLET | Refills: 3 | Status: SHIPPED | OUTPATIENT
Start: 2024-05-03

## 2024-05-03 NOTE — PROGRESS NOTES
Name: Diego Bell  Date: 5/3/2024    Referring Physician: No ref. provider found    HISTORY OF PRESENT ILLNESS   Diego Bell is a 78 year old male who presents for diabetes mellitus, diagnosed at age 51.      Prior HbA, C or glycohemoglobin were 7.5% 2015; 7.3% 3/2016; 7.3% 2016; 7.3% 2016; 7.6% 2017; 7.0% 2017; 7.3% 2018; 7.8% 2018; 7.8% 3/2019; 7.2% 2019; 8.7% 3/2020; 6.9% 2020; 7.3% 3/2021; 6.8% 10/2021; 6.7% 2022; 6.9% 3/2023; 6.5% 10/2023; 6.8% 3/2024     Dietary compliance: Fair -->he has taken classes over 10 years ago, watching CHO - increased fruit cups over the past several months   Exercise: No  Polyuria/polydipsia: No  Blurred vision: No    Episodes of hypoglycemia: No  Blood Glucose:  Checking 3 times per week  Fastin-130    Medications for DM   Trulicity 1.5mg SQ weekly  Glipizide ER 10mg PO BID   Farxiga 10mg PO daily     REVIEW OF SYSTEMS  Eyes: Diabetic retinopathy present: No            Most recent visit to eye doctor in last 12 months: Yes, 2021     CV: Cardiovascular disease present: No         Hypertension present: Yes         Hyperlipidemia present: Yes         Peripheral Vascular Disease present: No    : Nephropathy present: No, improved ELAINA due to NSAID    Neuro: Neuropathy present: No    Skin: Infection or ulceration: No    Osteoporosis: No - mild calcium elevation, he is taking Senior MVI.     Thyroid disease: No      Medications:     Current Outpatient Medications:     allopurinol 100 MG Oral Tab, Take 1 tablet (100 mg total) by mouth daily., Disp: 90 tablet, Rfl: 3    carvedilol 3.125 MG Oral Tab, Take 1 tablet (3.125 mg total) by mouth 2 (two) times daily with meals., Disp: 180 tablet, Rfl: 3    Fenofibrate 160 MG Oral Tab, Take 1 tablet (160 mg total) by mouth daily., Disp: 90 tablet, Rfl: 3    losartan 100 MG Oral Tab, Take 1 tablet (100 mg total) by mouth daily., Disp: 90 tablet, Rfl: 3    pravastatin 40 MG Oral Tab, Take 1 tablet (40 mg  total) by mouth nightly., Disp: 90 tablet, Rfl: 3    Sildenafil Citrate 100 MG Oral Tab, Take 1 tablet (100 mg total) by mouth daily as needed for Erectile Dysfunction., Disp: 6 tablet, Rfl: 3    levothyroxine (SYNTHROID) 50 MCG Oral Tab, Take 1 tablet (50 mcg total) by mouth before breakfast., Disp: 90 tablet, Rfl: 3    Multiple Vitamins-Minerals (ONE DAILY CALCIUM/IRON) Oral Tab, Take 1 tablet by mouth daily., Disp: , Rfl:     glipiZIDE ER 10 MG Oral Tablet 24 Hr, Take 1 tablet (10 mg total) by mouth 2 (two) times daily., Disp: 180 tablet, Rfl: 3    Glucose Blood (ONETOUCH VERIO) In Vitro Strip, Check glucose once daily, Disp: 100 strip, Rfl: 0    OneTouch Delica Lancets Fine Does not apply Misc, 1 lancet by Finger stick route daily., Disp: 90 each, Rfl: PRN    Multiple Vitamin (MULTI-VITAMIN DAILY) Oral Tab, Take 1 tablet by mouth daily., Disp: , Rfl:     aspirin 325 MG Oral Tab, Take 1 tablet (325 mg total) by mouth daily., Disp: , Rfl:     vitamin E 1000 UNITS Oral Cap, Take 1 capsule (1,000 Units total) by mouth every morning., Disp: , Rfl:     Vitamin C (VITAMIN C) 500 MG Oral Tab, Take 1 tablet (500 mg total) by mouth every morning., Disp: , Rfl:      Allergies:   Allergies   Allergen Reactions    Pcn [Penicillins] UNKNOWN       Social History:   Social History     Socioeconomic History    Marital status:    Occupational History    Occupation: Retired    Tobacco Use    Smoking status: Former     Current packs/day: 0.00     Average packs/day: 1.5 packs/day for 20.0 years (30.0 ttl pk-yrs)     Types: Cigarettes     Start date: 1963     Quit date: 1983     Years since quittin.3    Smokeless tobacco: Never   Vaping Use    Vaping status: Never Used   Substance and Sexual Activity    Alcohol use: Yes     Comment: couple drinks a month    Drug use: No   Other Topics Concern    Caffeine Concern Yes     Comment: coffee, soda, 3 cups daily       Medical History:   Past Medical History:     Acute, but ill-defined, cerebrovascular disease    Aortic atherosclerosis (HCC)    CT scan 4-14    Arthritis    ASD (atrial septal defect) (HCC)    WILD 9-16    Chronic kidney disease, stage II (mild)    Diabetes (HCC)    Diabetes mellitus (HCC)    Dyslipidemia    Essential hypertension    Hearing loss    History of CVA (cerebrovascular accident)    Multiple, asymptomatic    Hyperlipidemia    Hyperuricemia    Hypothyroidism    Morbid obesity (HCC)    Obesity    Obstructive sleep apnea    On nightly CPAP    Osteoarthritis    Status post right KANCHAN January 2013    Sleep apnea    Type 2 diabetes mellitus (HCC)       Surgical history:   Past Surgical History:   Procedure Laterality Date    Hernia surgery Right     Hip replacement surgery Right     Inguinal hernia repair Right 1979    Leg/ankle surgery proc unlisted Left 1980's    Bone chips removed from ankle    Other surgical history      Ankle    Skin surgery      Total hip replacement Right 01/2013    Vasectomy  1982         PHYSICAL EXAM  /71   Pulse 70   Ht 5' 10\" (1.778 m)   Wt 280 lb (127 kg)   BMI 40.18 kg/m²     General Appearance:  alert, well developed, in no acute distress  Eyes:  normal conjunctivae, sclera., normal sclera and normal pupils  Ears/Nose/Mouth/Throat/Neck:  no palpable thyroid nodules or cervical lymphadenopathy  Musculoskeletal:  normal muscle strength and tone  Skin:  normal moisture and skin texture  Hair & Nails:  normal scalp hair     Hematologic:  no excessive bruising  Neuro:  sensory grossly intact and motor grossly intact  Psychiatric:  oriented to time, self, and place  Nutritional:  no abnormal weight gain or loss    ASSESSMENT/PLAN:      1. Diabetes Mellitus Type 2, controlled  -controlled, HgA1c 6.8% -->stable   -Discussed importance of glycemic control to prevent complications of diabetes  -Discussed complications of diabetes include retinopathy, neuropathy, nephropathy and cardiovascular disease  -Discussed importance  of SBGM  -Discussed importance of low CHO diet, recommend 45gm per meal or 135gm per day  -Continue Trulicity   -Continue Glipizide 10mg PO BID   -Continue Farxiga 10mg PO daily   -UTD with optho   -Renal function improved  -BP normal at home - will follow up with Dr. Martin Cary  -Normal lipids  -Normal foot exam performed per Dr. Rosenberg last week     2. Subclinical Hypothyroidsim  -TFTs are improved  -Continue Levothyroxine 50mcg PO daily     3. Hypercalcemia   - Normalized     RTC 6 months    10/16/2023  Sophia Hernandez MD

## 2024-07-15 ENCOUNTER — TELEPHONE (OUTPATIENT)
Dept: ENDOCRINOLOGY CLINIC | Facility: CLINIC | Age: 79
End: 2024-07-15

## 2024-07-15 NOTE — TELEPHONE ENCOUNTER
Patient wife is requesting to speak with an RN.  She states that [facility] is out of Trulicty and will not be getting any in stock this year.  She is wanting to know what he should do.  Please call

## 2024-07-15 NOTE — TELEPHONE ENCOUNTER
Spoke to wife who reported difficulty finding trulicity. Per wife insurance only covers 3 month at a time.     Called [facility] at [phone]. Per rep they no longer send this medications via mail order due to shortages since May      Found the following at local [facility]:    [address] [place] [phone]   Trulicity 0.75 mg, 3 mg and 5.5 mg   Ozempic all strengths    [facility] in [place] [address] [facility] [phone]  Trulicty 1.5 mg three month supply available    Spoke to patient to notify him. Per patient \"I will look into it\". He will gives us a call, if he wants us to send the prescription over to [facility] in [place].

## 2024-09-18 ENCOUNTER — PATIENT MESSAGE (OUTPATIENT)
Dept: ENDOCRINOLOGY CLINIC | Facility: CLINIC | Age: 79
End: 2024-09-18

## 2024-09-18 ENCOUNTER — PATIENT MESSAGE (OUTPATIENT)
Dept: INTERNAL MEDICINE CLINIC | Facility: CLINIC | Age: 79
End: 2024-09-18

## 2024-09-18 DIAGNOSIS — E11.9 TYPE 2 DIABETES MELLITUS WITHOUT COMPLICATION, WITHOUT LONG-TERM CURRENT USE OF INSULIN (HCC): Primary | ICD-10-CM

## 2024-09-18 DIAGNOSIS — E03.8 SUBCLINICAL HYPOTHYROIDISM: ICD-10-CM

## 2024-09-19 NOTE — TELEPHONE ENCOUNTER
Dr. Rosenberg - patient's appointment is 10/10/24 (last physical 3/21/24). Do you need labs done for visit, please advise.

## 2024-09-20 NOTE — TELEPHONE ENCOUNTER
From: Diego Bell  To: Sophia Hernandez  Sent: 9/18/2024 12:06 PM CDT  Subject: Labs    Could dr Hernandez put in any lab work she wants done for my Oct appointment   Thanks   Diego Bell

## 2024-09-20 NOTE — TELEPHONE ENCOUNTER
Dr. Hernandez,  Follow-up scheduled 10/18/24    CMP, lipids, TSH, T4 and micro alb urine pended for approval (last checked in March 2024) - please advise if additional labs needed -thanks

## 2024-09-27 ENCOUNTER — LAB ENCOUNTER (OUTPATIENT)
Dept: LAB | Facility: HOSPITAL | Age: 79
End: 2024-09-27
Attending: INTERNAL MEDICINE
Payer: MEDICARE

## 2024-09-27 DIAGNOSIS — E03.8 SUBCLINICAL HYPOTHYROIDISM: ICD-10-CM

## 2024-09-27 DIAGNOSIS — E11.9 TYPE 2 DIABETES MELLITUS WITHOUT COMPLICATION, WITHOUT LONG-TERM CURRENT USE OF INSULIN (HCC): ICD-10-CM

## 2024-09-27 LAB
ALBUMIN SERPL-MCNC: 4.5 G/DL (ref 3.2–4.8)
ALBUMIN/GLOB SERPL: 1.6 {RATIO} (ref 1–2)
ALP LIVER SERPL-CCNC: 45 U/L
ALT SERPL-CCNC: 30 U/L
ANION GAP SERPL CALC-SCNC: 6 MMOL/L (ref 0–18)
AST SERPL-CCNC: 34 U/L (ref ?–34)
BILIRUB SERPL-MCNC: 0.5 MG/DL (ref 0.2–1.1)
BUN BLD-MCNC: 20 MG/DL (ref 9–23)
BUN/CREAT SERPL: 18 (ref 10–20)
CALCIUM BLD-MCNC: 9.8 MG/DL (ref 8.7–10.4)
CHLORIDE SERPL-SCNC: 109 MMOL/L (ref 98–112)
CHOLEST SERPL-MCNC: 156 MG/DL (ref ?–200)
CO2 SERPL-SCNC: 26 MMOL/L (ref 21–32)
CREAT BLD-MCNC: 1.11 MG/DL
CREAT UR-SCNC: 91.4 MG/DL
EGFRCR SERPLBLD CKD-EPI 2021: 68 ML/MIN/1.73M2 (ref 60–?)
EST. AVERAGE GLUCOSE BLD GHB EST-MCNC: 192 MG/DL (ref 68–126)
FASTING PATIENT LIPID ANSWER: YES
FASTING STATUS PATIENT QL REPORTED: YES
GLOBULIN PLAS-MCNC: 2.8 G/DL (ref 2–3.5)
GLUCOSE BLD-MCNC: 188 MG/DL (ref 70–99)
HBA1C MFR BLD: 8.3 % (ref ?–5.7)
HDLC SERPL-MCNC: 28 MG/DL (ref 40–59)
LDLC SERPL CALC-MCNC: 99 MG/DL (ref ?–100)
MICROALBUMIN UR-MCNC: 0.7 MG/DL
MICROALBUMIN/CREAT 24H UR-RTO: 7.7 UG/MG (ref ?–30)
NONHDLC SERPL-MCNC: 128 MG/DL (ref ?–130)
OSMOLALITY SERPL CALC.SUM OF ELEC: 300 MOSM/KG (ref 275–295)
POTASSIUM SERPL-SCNC: 4.7 MMOL/L (ref 3.5–5.1)
PROT SERPL-MCNC: 7.3 G/DL (ref 5.7–8.2)
SODIUM SERPL-SCNC: 141 MMOL/L (ref 136–145)
T4 FREE SERPL-MCNC: 1.2 NG/DL (ref 0.8–1.7)
TRIGL SERPL-MCNC: 164 MG/DL (ref 30–149)
TSI SER-ACNC: 2.88 MIU/ML (ref 0.55–4.78)
VLDLC SERPL CALC-MCNC: 27 MG/DL (ref 0–30)

## 2024-09-27 PROCEDURE — 84443 ASSAY THYROID STIM HORMONE: CPT

## 2024-09-27 PROCEDURE — 80053 COMPREHEN METABOLIC PANEL: CPT

## 2024-09-27 PROCEDURE — 36415 COLL VENOUS BLD VENIPUNCTURE: CPT

## 2024-09-27 PROCEDURE — 82570 ASSAY OF URINE CREATININE: CPT

## 2024-09-27 PROCEDURE — 82043 UR ALBUMIN QUANTITATIVE: CPT

## 2024-09-27 PROCEDURE — 84439 ASSAY OF FREE THYROXINE: CPT

## 2024-09-27 PROCEDURE — 83036 HEMOGLOBIN GLYCOSYLATED A1C: CPT

## 2024-09-27 PROCEDURE — 80061 LIPID PANEL: CPT

## 2024-10-01 ENCOUNTER — OFFICE VISIT (OUTPATIENT)
Dept: NEPHROLOGY | Facility: CLINIC | Age: 79
End: 2024-10-01
Payer: MEDICARE

## 2024-10-01 VITALS
BODY MASS INDEX: 40 KG/M2 | WEIGHT: 280 LBS | SYSTOLIC BLOOD PRESSURE: 129 MMHG | HEART RATE: 69 BPM | DIASTOLIC BLOOD PRESSURE: 80 MMHG

## 2024-10-01 DIAGNOSIS — I10 ESSENTIAL HYPERTENSION: ICD-10-CM

## 2024-10-01 DIAGNOSIS — N18.30 STAGE 3 CHRONIC KIDNEY DISEASE, UNSPECIFIED WHETHER STAGE 3A OR 3B CKD (HCC): Primary | ICD-10-CM

## 2024-10-01 PROCEDURE — 99213 OFFICE O/P EST LOW 20 MIN: CPT | Performed by: INTERNAL MEDICINE

## 2024-10-01 NOTE — PROGRESS NOTES
Progress Note     Diego Bell is a 78 yrs old male with pmh of DM x 20 yrs, HTN X 20 yrs, CKD stage II, sleep apnea, morbidly obese on CPAP, right hip replacement, CVA who presented today for follow up     Patient baseline creatinine is around 1.0-1.1 till 2013, his creatinine was 1.4 mg/dl with an eGFR 48 ml/min in 2015. His BUN/Cr from 3/1/16 -> 43/1.90 mg/dl with an eGFR 35 ml/min and now improved to his baseline of 1.1 mg/dl. His UA is 2015 was negative for proteinuria and microscopic hematuria and UACR has been negative. His Aic has been in low 7s for last 2 years.     Was taking OTC Aleve 3-4 times a week, denies any herbal supplement. - off of Aleve. Denies any personal and family history of nephrolithiasis. No FH of kidney disease or on dialysis.     Had MRI brain done which showed Remote bilateral thalamic lacunar infarcts. Remote left cerebellar.hemisphere lacunar infarct.    BP stable. No leg swelling or urinary complaints. No energy and appetite.      HISTORY:  Past Medical History:    Acute, but ill-defined, cerebrovascular disease    Aortic atherosclerosis (HCC)    CT scan 4-14    Arthritis    ASD (atrial septal defect) (HCC)    WILD 9-16    Chronic kidney disease, stage II (mild)    Diabetes (HCC)    Diabetes mellitus (HCC)    Dyslipidemia    Essential hypertension    Hearing loss    History of CVA (cerebrovascular accident)    Multiple, asymptomatic    Hyperlipidemia    Hyperuricemia    Hypothyroidism    Morbid obesity (HCC)    Obesity    Obstructive sleep apnea    On nightly CPAP    Osteoarthritis    Status post right KANCHAN January 2013    Sleep apnea    Type 2 diabetes mellitus (HCC)      Past Surgical History:   Procedure Laterality Date    Hernia surgery Right     Hip replacement surgery Right     Inguinal hernia repair Right 1979    Leg/ankle surgery proc unlisted Left 1980's    Bone chips removed from ankle    Other surgical history      Ankle    Skin surgery      Total hip replacement  Right 01/2013    Vasectomy  1982           Medications (Active prior to today's visit):  Current Outpatient Medications   Medication Sig Dispense Refill    glipiZIDE ER 10 MG Oral Tablet 24 Hr Take 1 tablet (10 mg total) by mouth 2 (two) times daily. 180 tablet 3    dapagliflozin (FARXIGA) 10 MG Oral Tab Take 1 tablet (10 mg total) by mouth daily. 90 tablet 1    allopurinol 100 MG Oral Tab Take 1 tablet (100 mg total) by mouth daily. 90 tablet 3    carvedilol 3.125 MG Oral Tab Take 1 tablet (3.125 mg total) by mouth 2 (two) times daily with meals. 180 tablet 3    Fenofibrate 160 MG Oral Tab Take 1 tablet (160 mg total) by mouth daily. 90 tablet 3    losartan 100 MG Oral Tab Take 1 tablet (100 mg total) by mouth daily. 90 tablet 3    pravastatin 40 MG Oral Tab Take 1 tablet (40 mg total) by mouth nightly. 90 tablet 3    Sildenafil Citrate 100 MG Oral Tab Take 1 tablet (100 mg total) by mouth daily as needed for Erectile Dysfunction. 6 tablet 3    levothyroxine (SYNTHROID) 50 MCG Oral Tab Take 1 tablet (50 mcg total) by mouth before breakfast. 90 tablet 3    Multiple Vitamins-Minerals (ONE DAILY CALCIUM/IRON) Oral Tab Take 1 tablet by mouth daily.      Multiple Vitamin (MULTI-VITAMIN DAILY) Oral Tab Take 1 tablet by mouth daily.      aspirin 325 MG Oral Tab Take 1 tablet (325 mg total) by mouth daily.      vitamin E 1000 UNITS Oral Cap Take 1 capsule (1,000 Units total) by mouth every morning.      Vitamin C (VITAMIN C) 500 MG Oral Tab Take 1 tablet (500 mg total) by mouth every morning.      Glucose Blood (ONETOUCH VERIO) In Vitro Strip Check glucose once daily 100 strip 1    Lancets (ONETOUCH DELICA PLUS [id number]) Does not apply Misc 1 each daily. 100 each 1    OneTouch Delica Lancets Fine Does not apply Misc 1 lancet by Finger stick route daily. 90 each PRN       Allergies:  Allergies   Allergen Reactions    Pcn [Penicillins] UNKNOWN         ROS:     Constitutional:  Negative for decreased activity, fever,  irritability and lethargy  ENMT:  Negative for ear drainage, hearing loss and nasal drainage  Eyes:  Negative for eye discharge and vision loss  Cardiovascular:  Negative for chest pain, sob  Respiratory:  Negative for cough, dyspnea and wheezing  Gastrointestinal:  Negative for abdominal pain, constipation  Genitourinary:  Negative for dysuria and hematuria  Hema/Lymph:  Negative for easy bleeding and easy bruising  Integumentary:  Negative for pruritus and rash  Musculoskeletal:  Negative for bone/joint symptoms  Neurological:  Negative for gait disturbance  Psychiatric:  Negative for inappropriate interaction and psychiatric symptoms      Vitals:    10/01/24 1000   BP: 129/80   Pulse: 69     Wt Readings from Last 6 Encounters:   10/01/24 280 lb (127 kg)   05/03/24 280 lb (127 kg)   04/02/24 281 lb (127.5 kg)   03/21/24 279 lb (126.6 kg)   10/16/23 277 lb (125.6 kg)   10/13/23 276 lb (125.2 kg)       PHYSICAL EXAM:     Constitutional: appears well hydrated alert and responsive   Head/Face: normocephalic  Eyes/Vision: normal extraocular motion is intact  Nose/Mouth/Throat:mucous membranes are moist    Neck/Thyroid: neck is supple without adenopathy  Skin/Hair: no abnormal bruising noted  Back/Spine: no abnormalities noted  Musculoskeletal: no deformities  Extremities: bilateral LE swelling - stable - ankle swelling   Neurological:  Grossly normal       ASSESSMENT/PLAN:     1. CKD stage II-III - non proteinuric    - CKD presumably secondary to diabetic nephropathy with hypertensive nephrosclerosis   - has an episode of ELAINA presumably secondary to combination of medication NSAID and likely Jardiance.   - creatinine at 1.2 mg/dl with an eGFR 62ml/min - stable  - UA unremarkable- +ve glucose from invokana. UACR <100.  on losartan 100mg daily   - serum albumin and calcium wnl  - goal Aic <7% - >8.3%. On trulicity and glipizide. Follows with Dr. Hernandez   -  on farxiga.   - avoid nephrotoxins - off NSAIDs   - LSM counseled  for weight loss     2. HTN:    - on carvedilol 3.125 mg BID and losartan 100mg daily   - home BP in 130s range  - weight stable in 285s   - improve leg swelling post discontinuation of amlodipine     3. Hypercalcemia: wnl  now   - follow up with Dr. Hernandez   - recent calcium 9.8  - hgb wnl   - on vitamin D 3 - over the counter every 2 weeks   - no other OTC calcium supplements     Follow up in 6-7 months     Urine test with other lab tests in 6 months       Orders This Visit:  Orders Placed This Encounter   Procedures    Urinalysis, Routine       Meds This Visit:  Requested Prescriptions      No prescriptions requested or ordered in this encounter       Imaging & Referrals:  None     10/1/2024  Wily Cary MD

## 2024-10-10 ENCOUNTER — OFFICE VISIT (OUTPATIENT)
Dept: INTERNAL MEDICINE CLINIC | Facility: CLINIC | Age: 79
End: 2024-10-10
Payer: MEDICARE

## 2024-10-10 VITALS
DIASTOLIC BLOOD PRESSURE: 82 MMHG | WEIGHT: 280 LBS | SYSTOLIC BLOOD PRESSURE: 130 MMHG | HEART RATE: 63 BPM | BODY MASS INDEX: 40.09 KG/M2 | HEIGHT: 70 IN

## 2024-10-10 DIAGNOSIS — N18.30 STAGE 3 CHRONIC KIDNEY DISEASE, UNSPECIFIED WHETHER STAGE 3A OR 3B CKD (HCC): ICD-10-CM

## 2024-10-10 DIAGNOSIS — I10 ESSENTIAL HYPERTENSION: Primary | ICD-10-CM

## 2024-10-10 DIAGNOSIS — E11.22 TYPE 2 DIABETES MELLITUS WITH STAGE 3 CHRONIC KIDNEY DISEASE, WITHOUT LONG-TERM CURRENT USE OF INSULIN, UNSPECIFIED WHETHER STAGE 3A OR 3B CKD (HCC): ICD-10-CM

## 2024-10-10 DIAGNOSIS — N18.30 TYPE 2 DIABETES MELLITUS WITH STAGE 3 CHRONIC KIDNEY DISEASE, WITHOUT LONG-TERM CURRENT USE OF INSULIN, UNSPECIFIED WHETHER STAGE 3A OR 3B CKD (HCC): ICD-10-CM

## 2024-10-10 PROCEDURE — 99214 OFFICE O/P EST MOD 30 MIN: CPT | Performed by: INTERNAL MEDICINE

## 2024-10-10 PROCEDURE — G2211 COMPLEX E/M VISIT ADD ON: HCPCS | Performed by: INTERNAL MEDICINE

## 2024-10-10 NOTE — PROGRESS NOTES
Diego Bell is a 78 year old male.  Chief Complaint   Patient presents with    Follow - Up     6 month - refused flu vaccine        HPI:   Comes for follow-up  C/C follow-up  C/o noted A1c went up and also his blood pressure is elevated but thinks it may be due to the cough medicine as he had a cold when he came back        HISTORY  Off of the metformin (didn't like what he read on it ) and Invokana and now on Farxiga-hasn't noticed a big change   [facility]         [facility]ORY   He is asking for a parking placard it is difficult for him at times especially with knee pain and hip pain-getting injections at [facility] for his knees and also had hip replacement and has to use cane especially for balance      History  Dr. Gutierrez from [facility] orthopedic and gets shots in the knees and also sees dr amaya      [facility]  Hypertension  hyperlipidemia  Hypothyroid  Osteoarthritis- knees  neck hands hips   Hyperuricemia  dm2-sees Dr. Pike  piter -on CPAP and sees Dr. rocha  CKD 3-- sees dr ejremiah carmona  ASD- heart dr   Atherosclerosis  Obese   History of COVID April 2022     Urologist -[facility]   ent - dr martha Mosquera dr - dr jeremiah miranda   Heart dr   Eye  - dr wilde    ---------------------------------------------------------------------------------------------          Current Outpatient Medications   Medication Sig Dispense Refill    glipiZIDE ER 10 MG Oral Tablet 24 Hr Take 1 tablet (10 mg total) by mouth 2 (two) times daily. 180 tablet 3    Glucose Blood (ONETOUCH VERIO) In Vitro Strip Check glucose once daily 100 strip 1    Lancets (ONETOUCH DELICA PLUS [id number]) Does not apply Misc 1 each daily. 100 each 1    dapagliflozin (FARXIGA) 10 MG Oral Tab Take 1 tablet (10 mg total) by mouth daily. 90 tablet 1    allopurinol 100 MG Oral Tab Take 1 tablet (100 mg total) by mouth daily. 90 tablet 3    carvedilol 3.125 MG Oral Tab Take 1 tablet (3.125 mg total) by  mouth 2 (two) times daily with meals. 180 tablet 3    Fenofibrate 160 MG Oral Tab Take 1 tablet (160 mg total) by mouth daily. 90 tablet 3    losartan 100 MG Oral Tab Take 1 tablet (100 mg total) by mouth daily. 90 tablet 3    pravastatin 40 MG Oral Tab Take 1 tablet (40 mg total) by mouth nightly. 90 tablet 3    levothyroxine (SYNTHROID) 50 MCG Oral Tab Take 1 tablet (50 mcg total) by mouth before breakfast. 90 tablet 3    Multiple Vitamins-Minerals (ONE DAILY CALCIUM/IRON) Oral Tab Take 1 tablet by mouth daily.      OneTouch Delica Lancets Fine Does not apply Misc 1 lancet by Finger stick route daily. 90 each PRN    Multiple Vitamin (MULTI-VITAMIN DAILY) Oral Tab Take 1 tablet by mouth daily.      aspirin 325 MG Oral Tab Take 1 tablet (325 mg total) by mouth daily.      vitamin E 1000 UNITS Oral Cap Take 1 capsule (1,000 Units total) by mouth every morning.      Vitamin C (VITAMIN C) 500 MG Oral Tab Take 1 tablet (500 mg total) by mouth every morning.        Past Medical History:    Acute, but ill-defined, cerebrovascular disease    Aortic atherosclerosis (HCC)    CT scan 4-14    Arthritis    ASD (atrial septal defect) (HCC)    WILD 9-16    Chronic kidney disease, stage II (mild)    Diabetes (HCC)    Diabetes mellitus (HCC)    Dyslipidemia    Essential hypertension    Hearing loss    History of CVA (cerebrovascular accident)    Multiple, asymptomatic    Hyperlipidemia    Hyperuricemia    Hypothyroidism    Morbid obesity (HCC)    Obesity    Obstructive sleep apnea    On nightly CPAP    Osteoarthritis    Status post right KANCHAN January 2013    Sleep apnea    Type 2 diabetes mellitus (HCC)      Past Surgical History:   Procedure Laterality Date    Hernia surgery Right     Hip replacement surgery Right     Inguinal hernia repair Right 1979    Leg/ankle surgery proc unlisted Left 1980's    Bone chips removed from ankle    Other surgical history      Ankle    Skin surgery      Total hip replacement Right 01/2013     Vasectomy        Social History:  Social History     Socioeconomic History    Marital status:    Occupational History    Occupation: Retired    Tobacco Use    Smoking status: Former     Current packs/day: 0.00     Average packs/day: 1.5 packs/day for 20.0 years (30.0 ttl pk-yrs)     Types: Cigarettes     Start date: 1963     Quit date: 1983     Years since quittin.8    Smokeless tobacco: Never   Vaping Use    Vaping status: Never Used   Substance and Sexual Activity    Alcohol use: Yes     Comment: couple drinks a month    Drug use: No   Other Topics Concern    Caffeine Concern Yes     Comment: coffee, soda, 3 cups daily        REVIEW OF SYSTEMS:   GENERAL HEALTH: No fevers, chills, sweats, fatigue  RESPIRATORY: denies shortness of breath, cough, wheezing  CARDIOVASCULAR: denies chest pain on exertion, palpitations, swelling in feet  NEURO: denies headaches , anxiety, depression    EXAM:   BP (!) 130/28   Pulse 63   Ht 5' 10\" (1.778 m)   Wt 280 lb (127 kg)   BMI 40.18 kg/m²   GENERAL: well developed, well nourished,in no apparent distress  SKIN: no rashes,no suspicious lesions  HEENT: atraumatic, normocephalic  NECK: supple,no adenopathy  LUNGS: clear to auscultation, no wheeze  CARDIO: RRR without murmur  GI: good BS's,no masses or tenderness  EXTREMITIES: no cyanosis, or edema  Bilateral barefoot skin diabetic exam is normal, visualized feet and the appearance is normal.  Bilateral monofilament/sensation of both feet is normal.  Pulsation pedal pulse exam of both lower legs/feet is normal as well.       ASSESSMENT AND PLAN:   Diagnoses and all orders for this visit:    Essential hypertension  -     CBC, Platelet; No Differential; Future  Advised pt to follow a low salt , low sodium (including fast foods and processed foods), can look up DASH diet, exercise 30 min a day , monitor bp   Cont meds      Stage 3 chronic kidney disease, unspecified whether stage 3a or 3b CKD  (HCC)  -     CBC, Platelet; No Differential; Future  Monitor, avoid NSAIDs      Type 2 diabetes mellitus with stage 3 chronic kidney disease, without long-term current use of insulin, unspecified whether stage 3a or 3b CKD (HCC)  -     CBC, Platelet; No Differential; Future    Hba1c 7.2 on 9/19 and 6.9 on 9/2020 and 7.3 on 3/2021 and 7.4 n 9/2021 and 6.9 on 8/3/2022 and 3/2023 and 6.5 on 10/2023 and 8.3 on 9/2024   [employer]  10/2024   Eye exam -sees - dr wilde  -has pt next week   On Asa , arb, statin   Foot - 10/12/2023  Diet -- advised to follow a low carb, low sugar diet , try to be consistent                Exercise 30 min a day                  Preventative medicine  Colonoscopy- as he declined cscope and FIT testing   Labs-9/2024   reviewed   Allergy to flu shot -- syncope with it   Declined pneumonia shot and flu vaccine   Osteoarthritis of multiple joints parking placard-scanned          The patient indicates understanding of these issues and agrees to the plan.  No follow-ups on file.

## 2024-10-17 ENCOUNTER — OFFICE VISIT (OUTPATIENT)
Dept: PULMONOLOGY | Facility: CLINIC | Age: 79
End: 2024-10-17
Payer: MEDICARE

## 2024-10-17 VITALS
DIASTOLIC BLOOD PRESSURE: 72 MMHG | WEIGHT: 280 LBS | HEIGHT: 70 IN | SYSTOLIC BLOOD PRESSURE: 175 MMHG | BODY MASS INDEX: 40.09 KG/M2 | HEART RATE: 70 BPM | OXYGEN SATURATION: 97 %

## 2024-10-17 DIAGNOSIS — G47.33 OBSTRUCTIVE SLEEP APNEA: Primary | ICD-10-CM

## 2024-10-17 PROCEDURE — 99213 OFFICE O/P EST LOW 20 MIN: CPT | Performed by: INTERNAL MEDICINE

## 2024-10-17 NOTE — PROGRESS NOTES
The patient is a 78-year-old male who I know well from prior evaluation comes in now for follow-up.  In general, he is doing well.  His downloaded data is excellent with average daily usage 8 hours and 20 minutes and residual events of 2.4/h.  He is benefiting from ongoing usage.    Review of Systems:  Vision normal. Ear nose and throat normal. Bowel normal. Bladder function normal. No depression. No thyroid disease. No lymphatic system concerns.  No rash. Muscles and joints unremarkable. No weight loss no weight gain.    Physical Examination:  Vital signs normal. HEENT examination is unremarkable with pupils equal round and reactive to light and accommodation. Neck without adenopathy, thyromegaly, JVD nor bruit. Lungs clear to auscultation and percussion. Cardiac regular rate and rhythm no murmur. Abdomen nontender, without hepatosplenomegaly and no mass appreciable. Extremities and Musculoskeletal without clubbing cyanosis nor edema, and mobility acceptable. Neurologic grossly intact with symmetric tone and strength and reflex.    Assessment and plan:  1.  Obstructive sleep apnea-excellent control.    Recommendations: Vigilance with CPAP every night all night, weight loss, avoid alcohol, avoid sedating drug, never drive if sleepy, see me again at the 1 year interval again with download of data and contact me promptly if new trouble.

## 2024-10-18 ENCOUNTER — TELEPHONE (OUTPATIENT)
Dept: ENDOCRINOLOGY CLINIC | Facility: CLINIC | Age: 79
End: 2024-10-18

## 2024-10-18 ENCOUNTER — OFFICE VISIT (OUTPATIENT)
Dept: ENDOCRINOLOGY CLINIC | Facility: CLINIC | Age: 79
End: 2024-10-18
Payer: MEDICARE

## 2024-10-18 ENCOUNTER — PATIENT MESSAGE (OUTPATIENT)
Dept: ENDOCRINOLOGY CLINIC | Facility: CLINIC | Age: 79
End: 2024-10-18

## 2024-10-18 VITALS
DIASTOLIC BLOOD PRESSURE: 87 MMHG | WEIGHT: 282 LBS | BODY MASS INDEX: 40 KG/M2 | HEART RATE: 69 BPM | SYSTOLIC BLOOD PRESSURE: 147 MMHG

## 2024-10-18 DIAGNOSIS — E11.9 TYPE 2 DIABETES MELLITUS WITHOUT COMPLICATION, WITHOUT LONG-TERM CURRENT USE OF INSULIN (HCC): Primary | ICD-10-CM

## 2024-10-18 LAB
GLUCOSE BLOOD: 195
TEST STRIP LOT #: NORMAL NUMERIC

## 2024-10-18 PROCEDURE — 99214 OFFICE O/P EST MOD 30 MIN: CPT | Performed by: INTERNAL MEDICINE

## 2024-10-18 PROCEDURE — 82947 ASSAY GLUCOSE BLOOD QUANT: CPT | Performed by: INTERNAL MEDICINE

## 2024-10-18 NOTE — TELEPHONE ENCOUNTER
Can you call [facility] to make sure he is going to get Mounjaro. Thanks.

## 2024-10-18 NOTE — PROGRESS NOTES
Name: Diego Bell  Date: 10/18/2024    Referring Physician: No ref. provider found    HISTORY OF PRESENT ILLNESS   Diego Bell is a 78 year old male who presents for diabetes mellitus, diagnosed at age 51.      Prior HbA, C or glycohemoglobin were 7.5% 2015; 7.3% 3/2016; 7.3% 2016; 7.3% 2016; 7.6% 2017; 7.0% 2017; 7.3% 2018; 7.8% 2018; 7.8% 3/2019; 7.2% 2019; 8.7% 3/2020; 6.9% 2020; 7.3% 3/2021; 6.8% 10/2021; 6.7% 2022; 6.9% 3/2023; 6.5% 10/2023; 6.8% 3/2024; 8.3% 2024    Dietary compliance: Fair -->he has taken classes over 10 years ago, watching CHO - increased fruit cups over the past several months   Exercise: No  Polyuria/polydipsia: No  Blurred vision: No    Episodes of hypoglycemia: No  Blood Glucose:  Checking 3 times per week  Fastin-140    Medications for DM   Trulicity 1.5mg SQ weekly   Glipizide ER 10mg PO BID   Farxiga 10mg PO daily     REVIEW OF SYSTEMS  Eyes: Diabetic retinopathy present: No            Most recent visit to eye doctor in last 12 months: Yes, 2021     CV: Cardiovascular disease present: No         Hypertension present: Yes         Hyperlipidemia present: Yes         Peripheral Vascular Disease present: No    : Nephropathy present: No, improved ELAINA due to NSAID    Neuro: Neuropathy present: No    Skin: Infection or ulceration: No    Osteoporosis: No - mild calcium elevation, he is taking Senior MVI.     Thyroid disease: No      Medications:     Current Outpatient Medications:     glipiZIDE ER 10 MG Oral Tablet 24 Hr, Take 1 tablet (10 mg total) by mouth 2 (two) times daily., Disp: 180 tablet, Rfl: 3    Glucose Blood (ONETOUCH VERIO) In Vitro Strip, Check glucose once daily, Disp: 100 strip, Rfl: 1    Lancets (ONETOUCH DELICA PLUS [id number]) Does not apply Misc, 1 each daily., Disp: 100 each, Rfl: 1    dapagliflozin (FARXIGA) 10 MG Oral Tab, Take 1 tablet (10 mg total) by mouth daily., Disp: 90 tablet, Rfl: 1    allopurinol 100 MG Oral Tab,  Take 1 tablet (100 mg total) by mouth daily., Disp: 90 tablet, Rfl: 3    carvedilol 3.125 MG Oral Tab, Take 1 tablet (3.125 mg total) by mouth 2 (two) times daily with meals., Disp: 180 tablet, Rfl: 3    Fenofibrate 160 MG Oral Tab, Take 1 tablet (160 mg total) by mouth daily., Disp: 90 tablet, Rfl: 3    losartan 100 MG Oral Tab, Take 1 tablet (100 mg total) by mouth daily., Disp: 90 tablet, Rfl: 3    pravastatin 40 MG Oral Tab, Take 1 tablet (40 mg total) by mouth nightly., Disp: 90 tablet, Rfl: 3    levothyroxine (SYNTHROID) 50 MCG Oral Tab, Take 1 tablet (50 mcg total) by mouth before breakfast., Disp: 90 tablet, Rfl: 3    Multiple Vitamins-Minerals (ONE DAILY CALCIUM/IRON) Oral Tab, Take 1 tablet by mouth daily., Disp: , Rfl:     OneTouch Delica Lancets Fine Does not apply Misc, 1 lancet by Finger stick route daily., Disp: 90 each, Rfl: PRN    Multiple Vitamin (MULTI-VITAMIN DAILY) Oral Tab, Take 1 tablet by mouth daily., Disp: , Rfl:     aspirin 325 MG Oral Tab, Take 1 tablet (325 mg total) by mouth daily., Disp: , Rfl:     vitamin E 1000 UNITS Oral Cap, Take 1 capsule (1,000 Units total) by mouth every morning., Disp: , Rfl:     Vitamin C (VITAMIN C) 500 MG Oral Tab, Take 1 tablet (500 mg total) by mouth every morning., Disp: , Rfl:      Allergies:   Allergies   Allergen Reactions    Pcn [Penicillins] UNKNOWN       Social History:   Social History     Socioeconomic History    Marital status:    Occupational History    Occupation: Retired    Tobacco Use    Smoking status: Former     Current packs/day: 0.00     Average packs/day: 1.5 packs/day for 20.0 years (30.0 ttl pk-yrs)     Types: Cigarettes     Start date: 1963     Quit date: 1983     Years since quittin.8    Smokeless tobacco: Never   Vaping Use    Vaping status: Never Used   Substance and Sexual Activity    Alcohol use: Yes     Comment: couple drinks a month    Drug use: No   Other Topics Concern    Caffeine Concern Yes      Comment: coffee, soda, 3 cups daily       Medical History:   Past Medical History:    Acute, but ill-defined, cerebrovascular disease    Aortic atherosclerosis (HCC)    CT scan 4-14    Arthritis    ASD (atrial septal defect) (HCC)    WILD 9-16    Chronic kidney disease, stage II (mild)    Diabetes (HCC)    Diabetes mellitus (HCC)    Dyslipidemia    Essential hypertension    Hearing loss    History of CVA (cerebrovascular accident)    Multiple, asymptomatic    Hyperlipidemia    Hyperuricemia    Hypothyroidism    Morbid obesity (HCC)    Obesity    Obstructive sleep apnea    On nightly CPAP    Osteoarthritis    Status post right KANCHAN January 2013    Sleep apnea    Type 2 diabetes mellitus (HCC)       Surgical history:   Past Surgical History:   Procedure Laterality Date    Hernia surgery Right     Hip replacement surgery Right     Inguinal hernia repair Right 1979    Leg/ankle surgery proc unlisted Left 1980's    Bone chips removed from ankle    Other surgical history      Ankle    Skin surgery      Total hip replacement Right 01/2013    Vasectomy  1982         PHYSICAL EXAM  BP (!) 167/89   Pulse 69   Wt 282 lb (127.9 kg)   BMI 40.46 kg/m²   Home /76    General Appearance:  alert, well developed, in no acute distress  Eyes:  normal conjunctivae, sclera., normal sclera and normal pupils  Ears/Nose/Mouth/Throat/Neck:  no palpable thyroid nodules or cervical lymphadenopathy  Musculoskeletal:  normal muscle strength and tone  Skin:  normal moisture and skin texture  Hair & Nails:  normal scalp hair     Hematologic:  no excessive bruising  Neuro:  sensory grossly intact and motor grossly intact  Psychiatric:  oriented to time, self, and place  Nutritional:  no abnormal weight gain or loss    ASSESSMENT/PLAN:      1. Diabetes Mellitus Type 2, controlled  -controlled, HgA1c 8.3% -->significant increase  -Unclear cause for higher BG levels   -Discussed importance of glycemic control to prevent complications of  diabetes  -Discussed complications of diabetes include retinopathy, neuropathy, nephropathy and cardiovascular disease  -Discussed importance of SBGM  -Discussed importance of low CHO diet, recommend 45gm per meal or 135gm per day  -Discontinue Trulicity  -Start Mounjaro 7.5mg subcutaneous weekly, verbalized undertanding of risks and benefits   -Continue Glipizide 10mg PO BID   -Continue Farxiga 10mg PO daily   -UTD with optho   -Renal function improved  -BP normal at home - will follow up with Dr. Martin Cary  -BP elevated, improved on repeat   -Normal lipids  -Normal foot exam performed per Dr. Rosenberg last week     2. Subclinical Hypothyroidsim  -TFTs are improved  -Continue Levothyroxine 50mcg PO daily     3. Hypercalcemia   - Normalized     RTC 6 months    10/18/2024  Sophia Hernandez MD

## 2024-10-18 NOTE — TELEPHONE ENCOUNTER
Spoke with Jia, she stated she does not see the prescription yet. Advised to call back later.

## 2024-10-21 NOTE — TELEPHONE ENCOUNTER
Called pharmacy back and confirmed receiving the RX for Mounjaro and going through.  They are ordering the medication for tomorrow .

## 2024-10-21 NOTE — TELEPHONE ENCOUNTER
Mounjaro 7.5mg RX sent to [facility] in [place] per patient request   sent updating patient

## 2024-10-29 ENCOUNTER — IMMUNIZATION (OUTPATIENT)
Dept: LAB | Age: 79
End: 2024-10-29
Attending: EMERGENCY MEDICINE
Payer: MEDICARE

## 2024-10-29 DIAGNOSIS — Z23 NEED FOR VACCINATION: Primary | ICD-10-CM

## 2024-10-29 PROCEDURE — 90480 ADMN SARSCOV2 VAC 1/ONLY CMP: CPT

## 2024-11-12 ENCOUNTER — PATIENT MESSAGE (OUTPATIENT)
Dept: INTERNAL MEDICINE CLINIC | Facility: CLINIC | Age: 79
End: 2024-11-12

## 2024-11-17 ENCOUNTER — PATIENT MESSAGE (OUTPATIENT)
Dept: ENDOCRINOLOGY CLINIC | Facility: CLINIC | Age: 79
End: 2024-11-17

## 2024-11-18 RX ORDER — DAPAGLIFLOZIN 10 MG/1
10 TABLET, FILM COATED ORAL DAILY
Qty: 90 TABLET | Refills: 1 | Status: SHIPPED | OUTPATIENT
Start: 2024-11-18 | End: 2025-05-17

## 2025-01-16 ENCOUNTER — TELEPHONE (OUTPATIENT)
Dept: PULMONOLOGY | Facility: CLINIC | Age: 80
End: 2025-01-16

## 2025-01-16 NOTE — TELEPHONE ENCOUNTER
Received request from [employer] for CPAP resupply via parachute. Placed in Dr. Marmolejo's folder to sign.

## 2025-01-16 NOTE — TELEPHONE ENCOUNTER
Order for CPAP supplies [facility] and [facility] [facility]. Sent to scanning.

## 2025-02-19 ENCOUNTER — TELEPHONE (OUTPATIENT)
Dept: INTERNAL MEDICINE CLINIC | Facility: CLINIC | Age: 80
End: 2025-02-19

## 2025-03-16 DIAGNOSIS — E11.22 TYPE 2 DIABETES MELLITUS WITH STAGE 3 CHRONIC KIDNEY DISEASE, WITHOUT LONG-TERM CURRENT USE OF INSULIN, UNSPECIFIED WHETHER STAGE 3A OR 3B CKD (HCC): ICD-10-CM

## 2025-03-16 DIAGNOSIS — E79.0 HYPERURICEMIA: ICD-10-CM

## 2025-03-16 DIAGNOSIS — E78.5 HYPERLIPIDEMIA LDL GOAL <100: ICD-10-CM

## 2025-03-16 DIAGNOSIS — N18.30 TYPE 2 DIABETES MELLITUS WITH STAGE 3 CHRONIC KIDNEY DISEASE, WITHOUT LONG-TERM CURRENT USE OF INSULIN, UNSPECIFIED WHETHER STAGE 3A OR 3B CKD (HCC): ICD-10-CM

## 2025-03-16 DIAGNOSIS — I10 ESSENTIAL HYPERTENSION: ICD-10-CM

## 2025-03-16 DIAGNOSIS — E03.9 HYPOTHYROIDISM, UNSPECIFIED TYPE: ICD-10-CM

## 2025-03-19 RX ORDER — ALLOPURINOL 100 MG/1
100 TABLET ORAL DAILY
Qty: 90 TABLET | Refills: 3 | Status: SHIPPED | OUTPATIENT
Start: 2025-03-19

## 2025-03-19 RX ORDER — CARVEDILOL 3.12 MG/1
3.12 TABLET ORAL 2 TIMES DAILY WITH MEALS
Qty: 180 TABLET | Refills: 3 | Status: SHIPPED | OUTPATIENT
Start: 2025-03-19

## 2025-03-19 RX ORDER — PRAVASTATIN SODIUM 40 MG
40 TABLET ORAL NIGHTLY
Qty: 90 TABLET | Refills: 3 | Status: SHIPPED | OUTPATIENT
Start: 2025-03-19

## 2025-03-19 RX ORDER — LEVOTHYROXINE SODIUM 50 UG/1
50 TABLET ORAL
Qty: 90 TABLET | Refills: 3 | Status: SHIPPED | OUTPATIENT
Start: 2025-03-19

## 2025-03-19 RX ORDER — FENOFIBRATE 160 MG/1
160 TABLET ORAL DAILY
Qty: 90 TABLET | Refills: 3 | Status: SHIPPED | OUTPATIENT
Start: 2025-03-19

## 2025-03-19 RX ORDER — LOSARTAN POTASSIUM 100 MG/1
100 TABLET ORAL DAILY
Qty: 90 TABLET | Refills: 3 | Status: SHIPPED | OUTPATIENT
Start: 2025-03-19

## 2025-03-19 NOTE — TELEPHONE ENCOUNTER
Refill passed per Clinic protocol.  Requested Prescriptions   Pending Prescriptions Disp Refills    PRAVASTATIN 40 MG Oral Tab [Pharmacy Med Name: PRAVASTATIN  TAB 40MG] 90 tablet 3     Sig: TAKE 1 TABLET NIGHTLY       Cholesterol Medication Protocol Passed - 3/19/2025  3:02 PM        Passed - ALT < 80     Lab Results   Component Value Date    ALT 30 09/27/2024             Passed - ALT resulted within past year        Passed - Lipid panel within past 12 months     Lab Results   Component Value Date    CHOLEST 156 09/27/2024    TRIG 164 (H) 09/27/2024    HDL 28 (L) 09/27/2024    LDL 99 09/27/2024    VLDL 27 09/27/2024    NONHDLC 128 09/27/2024             Passed - In person appointment or virtual visit in the past 12 mos or appointment in next 3 mos     Recent Outpatient Visits              5 months ago Type 2 diabetes mellitus without complication, without long-term current use of insulin ([facility])    [facility] Sophia Hernandez MD    Office Visit    5 months ago Obstructive sleep apnea    [facility] Johann Marmolejo MD    Office Visit    5 months ago Essential hypertension    [facility] Desire Rosenberg MD    Office Visit    5 months ago Stage 3 chronic kidney disease, unspecified whether stage 3a or 3b CKD ([facility])    [facility] Wily Carty MD    Office Visit    10 months ago Subclinical hypothyroidism    [facility] Sophia Hernandez MD    Office Visit          Future Appointments         Provider Department Appt Notes    In 5 months Desire Rosenberg MD [facility] Blood work    In 5 months Sophia Hernandez MD [facility] Blood work and labs                    Passed - Medication is active on med list           CARVEDILOL 3.125 MG Oral Tab [Pharmacy Med Name: CARVEDILOL TAB 3.125MG] 180 tablet 3     Sig: TAKE 1 TABLET TWICE DAILY  WITH MEALS       Hypertension Medications Protocol Failed - 3/19/2025  3:02 PM        Failed - Last BP reading less than 140/90     BP Readings from Last 1 Encounters:   10/18/24 147/87               Passed - CMP or BMP in past 12 months        Passed - In person appointment or virtual visit in the past 12 mos or appointment in next 3 mos     Recent Outpatient Visits              5 months ago Type 2 diabetes mellitus without complication, without long-term current use of insulin ([facility])    [facility] Sophia Hernandez MD    Office Visit    5 months ago Obstructive sleep apnea    [facility] Johann Marmolejo MD    Office Visit    5 months ago Essential hypertension    [facility] Desire Rosenberg MD    Office Visit    5 months ago Stage 3 chronic kidney disease, unspecified whether stage 3a or 3b CKD ([facility])    [facility] Wily Carty MD    Office Visit    10 months ago Subclinical hypothyroidism    [facility] Sophia Hernandez MD    Office Visit          Future Appointments         Provider Department Appt Notes    In 5 months Desire Rosenberg MD [facility] Blood work    In 5 months Sophia Hernandez MD [facility] Blood work and labs                    Passed - EGFRCR or GFRNAA > 50     GFR Evaluation  EGFRCR: 68 , resulted on 9/27/2024          Passed - Medication is active on med list          ALLOPURINOL 100 MG Oral Tab [Pharmacy Med Name: ALLOPURINOL  TAB 100MG] 90 tablet 3     Sig: TAKE 1 TABLET DAILY       There is no refill protocol information for this order       SYNTHROID 50 MCG Oral  Tab [Pharmacy Med Name: SYNTHROID TAB 50MCG] 90 tablet 3     Sig: TAKE 1 TABLET BEFORE       BREAKFAST       Thyroid Medication Protocol Passed - 3/19/2025  3:02 PM        Passed - TSH in past 12 months        Passed - Last TSH value is normal     Lab Results   Component Value Date    TSH 2.876 09/27/2024                 Passed - In person appointment or virtual visit in the past 12 mos or appointment in next 3 mos     Recent Outpatient Visits              5 months ago Type 2 diabetes mellitus without complication, without long-term current use of insulin ([facility])    [facility] Sophia [facility] MD    Office Visit    5 months ago Obstructive sleep apnea    [facility] Johann Marmolejo MD    Office Visit    5 months ago Essential hypertension    [facility] Desire Rosenberg MD    Office Visit    5 months ago Stage 3 chronic kidney disease, unspecified whether stage 3a or 3b CKD ([facility])    [facility] Wily Carty MD    Office Visit    10 months ago Subclinical hypothyroidism    [facility] Sophia Hernandez MD    Office Visit          Future Appointments         Provider Department Appt Notes    In 5 months Desire Rosenberg MD [facility] Blood work    In 5 months [facility]ia Hernandez MD [facility] Blood [facility] and labs                    Passed - Medication is active on med list          LOSARTAN 100 MG Oral Tab [Pharmacy Med Name: LOSARTAN POT TAB 100MG] 90 tablet 3     Sig: TAKE 1 TABLET DAILY       Hypertension Medications Protocol Failed - 3/19/2025  3:02 PM        Failed - Last BP reading less than 140/90     BP Readings from Last 1 Encounters:   10/18/24 147/87               Passed - CMP or BMP in past 12 months         Passed - In person appointment or virtual visit in the past 12 mos or appointment in next 3 mos     Recent Outpatient Visits              5 months ago Type 2 diabetes mellitus without complication, without long-term current use of insulin ([facility])    [facility] Sophia Hernandez MD    Office Visit    5 months ago Obstructive sleep apnea    [facility] Johann Marmolejo MD    Office Visit    5 months ago Essential hypertension    [facility]    Office Visit    5 months ago Stage 3 chronic kidney disease, unspecified whether stage 3a or 3b CKD ([facility])    [facility] Wily Carty MD    Office Visit    10 months ago Subclinical hypothyroidism    [facility] Sophia Hernandez MD    Office Visit          Future Appointments         Provider Department Appt Notes    In 5 months [facility] Blood [facility]    In 5 months Sophia Hernandez MD [facility] Blood work and labs                    Passed - EGFRCR or GFRNAA > 50     GFR Evaluation  EGFRCR: 68 , resulted on 9/27/2024          Passed - Medication is active on med list          FENOFIBRATE 160 MG Oral Tab [Pharmacy Med Name: FENOFIBRATE  TAB 160MG] 90 tablet 3     Sig: TAKE 1 TABLET DAILY       Cholesterol Medication Protocol Passed - 3/19/2025  3:02 PM        Passed - ALT < 80     Lab Results   Component Value Date    ALT 30 09/27/2024             Passed - ALT resulted within past year        Passed - Lipid panel within past 12 months     Lab Results   Component Value Date    CHOLEST 156 09/27/2024    TRIG 164 (H) 09/27/2024    HDL 28 (L) 09/27/2024    LDL 99 09/27/2024    VLDL 27 09/27/2024    NONHDLC 128 09/27/2024             Passed -  In person appointment or virtual visit in the past 12 mos or appointment in next 3 mos     Recent Outpatient Visits              5 months ago Type 2 diabetes mellitus without complication, without long-term current use of insulin ([facility])    [facility] Sophia [facility] MD    Office Visit    5 months ago Obstructive sleep apnea    [facility] Johann Marmolejo MD    Office Visit    5 months ago Essential hypertension    [facility] Desire Rosenberg MD    Office Visit    5 months ago Stage 3 chronic kidney disease, unspecified whether stage 3a or 3b CKD ([facility])    [facility] Wily Carty MD    Office Visit    10 months ago Subclinical hypothyroidism    [facility] Sophia Hernandez MD    Office Visit          Future Appointments         Provider Department Appt Notes    In 5 months Desire Rosenberg MD [facility] Blood work    In 5 months Sophia Hernandez MD [facility] Blood work and labs                    Passed - Medication is active on med list

## 2025-03-28 DIAGNOSIS — E11.9 TYPE 2 DIABETES MELLITUS WITHOUT COMPLICATION, WITHOUT LONG-TERM CURRENT USE OF INSULIN (HCC): ICD-10-CM

## 2025-03-28 RX ORDER — TIRZEPATIDE 7.5 MG/.5ML
7.5 INJECTION, SOLUTION SUBCUTANEOUS WEEKLY
Qty: 6 ML | Refills: 1 | Status: SHIPPED | OUTPATIENT
Start: 2025-03-28 | End: 2025-03-31

## 2025-03-28 NOTE — TELEPHONE ENCOUNTER
Endocrine Refill protocol for oral and injectable diabetic medications    Protocol Criteria:  PASSED  Reason: N/A    If all below requirements are met, send a 90-day supply with 1 refill per provider protocol.    Verify appointment with Endocrinology completed in the last 6 months or scheduled in the next 3 months.  Verify A1C has been completed within the last 6 months and is below 8.5%     Last completed office visit: 10/18/2024 Sophia Hernandez MD   Next scheduled Follow up:   Future Appointments   Date Time Provider Department Center   10/1/2025 10:00 AM Desire Rosenberg MD [facility]   10/2/2025  1:00 PM Sophia Hernandez MD [facility]      Last A1c result: Last A1c value was 8.3% done 9/27/2024.

## 2025-04-15 RX ORDER — DAPAGLIFLOZIN 10 MG/1
10 TABLET, FILM COATED ORAL DAILY
Qty: 90 TABLET | Refills: 1 | Status: SHIPPED | OUTPATIENT
Start: 2025-04-15 | End: 2025-10-12

## 2025-04-15 NOTE — TELEPHONE ENCOUNTER
Endocrine Refill protocol for oral and injectable diabetic medications    Protocol Criteria:  PASSED  Reason: N/A    If all below requirements are met, send a 90-day supply with 1 refill per provider protocol.    Verify appointment with Endocrinology completed in the last 6 months or scheduled in the next 3 months.  Verify A1C has been completed within the last 6 months and is below 8.5%     Last completed office visit: 10/18/2024 Sophia Hernandez MD   Next scheduled Follow up:   Future Appointments   Date Time Provider Department Center          10/2/2025  1:00 PM Sophia Hernandez MD [facility]      Last A1c result: Last A1c value was 8.3% done 9/27/2024.

## 2025-05-31 DIAGNOSIS — E11.9 TYPE 2 DIABETES MELLITUS WITHOUT COMPLICATION, WITHOUT LONG-TERM CURRENT USE OF INSULIN (HCC): ICD-10-CM

## 2025-05-31 RX ORDER — GLIPIZIDE 10 MG/1
10 TABLET, FILM COATED, EXTENDED RELEASE ORAL 2 TIMES DAILY
Qty: 180 TABLET | Refills: 3 | Status: SHIPPED | OUTPATIENT
Start: 2025-05-31

## 2025-05-31 NOTE — TELEPHONE ENCOUNTER
Endocrine Refill protocol for oral and injectable diabetic medications    Protocol Criteria:  PASSED  Reason: N/A    If all below requirements are met, send a 90-day supply with 1 refill per provider protocol.    Verify appointment with Endocrinology completed in the last 6 months or scheduled in the next 3 months.  Verify A1C has been completed within the last 6 months and is below 8.5%     Last completed office visit: 10/18/2024 Sophia Hernandez MD   Next scheduled Follow up:   Future Appointments   Date Time Provider Department Center          10/2/2025  1:00 PM Sophia Hernandez MD [facility]      Last A1c result: Last A1c value was 8.3% done 9/27/2024.

## 2025-08-14 ENCOUNTER — PATIENT MESSAGE (OUTPATIENT)
Dept: INTERNAL MEDICINE CLINIC | Facility: CLINIC | Age: 80
End: 2025-08-14

## 2025-08-14 DIAGNOSIS — E11.29 TYPE 2 DIABETES MELLITUS WITH OTHER KIDNEY COMPLICATION, UNSPECIFIED WHETHER LONG TERM INSULIN USE (HCC): ICD-10-CM

## 2025-08-14 DIAGNOSIS — E78.5 HYPERLIPIDEMIA, UNSPECIFIED HYPERLIPIDEMIA TYPE: ICD-10-CM

## 2025-08-14 DIAGNOSIS — Z00.00 ROUTINE GENERAL MEDICAL EXAMINATION AT A HEALTH CARE FACILITY: Primary | ICD-10-CM

## 2025-08-18 RX ORDER — DAPAGLIFLOZIN 10 MG/1
10 TABLET, FILM COATED ORAL DAILY
Qty: 90 TABLET | Refills: 1 | Status: SHIPPED | OUTPATIENT
Start: 2025-08-18 | End: 2026-02-14

## (undated) DIAGNOSIS — E11.65 UNCONTROLLED TYPE 2 DIABETES MELLITUS WITH HYPERGLYCEMIA (HCC): ICD-10-CM

## (undated) DIAGNOSIS — E11.9 TYPE 2 DIABETES MELLITUS WITHOUT COMPLICATION, WITHOUT LONG-TERM CURRENT USE OF INSULIN (HCC): ICD-10-CM

## (undated) DIAGNOSIS — Z11.52 ENCOUNTER FOR SCREENING FOR COVID-19: Primary | ICD-10-CM

## (undated) NOTE — MR AVS SNAPSHOT
[address], [address]  [address] [facility]  [phone]               Thank you for choosing us for your health care visit with Harley Dawkins MD.  We are glad to serve you and happy to provide you with this summary o Commonly known as:  NORVASC           aspirin 325 MG Tabs   Take 325 mg by mouth daily. Dulaglutide 1.5 MG/0.5ML Sopn   Inject 1.5 mg into the skin every 7 days.  Inject 1.5 mg into the skin every 7 days   Commonly known as:  TRULICITY           F Test Strip Lot # [id number] Numeric    Test Strip Expiration Date 6/30/2018 Date                  MyChart     Visit Invisible  You can access your MyChart to more actively manage your health care and view more details from this visit by going to [email or web address]

## (undated) NOTE — LETTER
AUTHORIZATION FOR SURGICAL OPERATION OR OTHER PROCEDURE    1. I hereby authorize Dr. Lida Hayes, and [place] [facility] staff assigned to my case to perform the following operation and/or procedure at the [place]:    ____________________Excision of palate lesions ___________________________________________________________________________      _______________________________________________________________________________________________    2. My physician has explained the nature and purpose of the operation or other procedure, possible alternative methods of treatment, the risks involved, and the possibility of complication to me. I acknowledge that no guarantee has been made as to the result that may be obtained. 3.  I recognize that, during the course of this operation, or other procedure, unforseen conditions may necessitate additional or different procedure than those listed above. I, therefore, further authorize and request that the above named physician, his/her physician assistants or designees perform such procedures as are, in his/her professional opinion, necessary and desirable. 4.  Any tissue or organs removed in the operation or other procedure may be disposed of by and at the discretion of the [place] and [facility] AT [facility]. 5.  I understand that in the event of a medical emergency, I will be transported by local paramedics to [facility] or other [facility] emergency department. 6.  I certify that I have read and fully understand the above consent to operation and/or other procedure. 7.  I acknowledge that my physician has explained sedation/analgesia administration to me including the risks and benefits. I consent to the administration of sedation/analgesia as may be necessary or desirable in the judgement of my physician.     Witness signature: ___________________________________________________ Date:  ______/______/_____                    Time: ________ A. M.  P.M. Patient Name:  ______________________________________________________  (please print)      Patient signature:  ___________________________________________________             Relationship to Patient:           []  Parent    Responsible person                          []  Spouse  In case of minor or                    [] Other  _____________   Incompetent name:  __________________________________________________                               (please print)      _____________      Responsible person  In case of minor or  Incompetent signature:  _______________________________________________    Statement of Physician  My signature below affirms that prior to the time of the procedure, I have explained to the patient and/or his/her guardian, the risks and benefits involved in the proposed treatment and any reasonable alternative to the proposed treatment. I have also explained the risks and benefits involved in the refusal of the proposed treatment and have answered the patient's questions.                         Date:  ______/______/_______  Provider                      Signature:  __________________________________________________________       Time:  ___________ A.M    P.M.

## (undated) NOTE — LETTER
01/06/20        [address],[address]      Dear Joycelyn Dandy records indicate that you have outstanding lab work and or testing that was ordered for you and has not yet been completed:  Orders Placed This Encounter

## (undated) NOTE — LETTER
2/6/2019              [address]         Dear Marianne Izaguirre records indicate that the tests ordered for you by Alda Zapata MD  have not been done.   If you have, in fact, already completed the tests